# Patient Record
Sex: MALE | Race: WHITE | NOT HISPANIC OR LATINO | Employment: FULL TIME | URBAN - METROPOLITAN AREA
[De-identification: names, ages, dates, MRNs, and addresses within clinical notes are randomized per-mention and may not be internally consistent; named-entity substitution may affect disease eponyms.]

---

## 2018-01-11 NOTE — RESULT NOTES
Verified Results  (LC) TSH Rfx on Abnormal to Free T4 [id number] 07:47AM [facility]     Test Name Result Flag Reference   TSH 10 850 uIU/mL H 0 450-4 500   T4,Free (Direct) 1 21 ng/dL  0 82-1 77

## 2018-01-13 NOTE — RESULT NOTES
Verified Results  (LC) TSH reflex to T4F [id number] 12:00AM [facility]     Test Name Result Flag Reference   TSH 7 810 uIU/mL H 0 450-4 500   T4,Free (Direct) 1 11 ng/dL  0 82-1 77       Plan  TSH elevation    · (1) TSH WITH FT4 REFLEX; Status:Active;  Requested [id number];

## 2018-01-18 NOTE — PSYCH
Behavioral Health Outpatient Intake    Referred By: DR Adele Lara  Intake Questions: there are no developmental disabilities  the patient does not have a hearing impairment  the patient does not have an ICM or CTT  patient is not taking injectable psychiatric medications  Employment: The patient is employed at [employer]  Emergency Contact Information:   Emergency Contact: Liz Ledezma   Relationship to Patient: WIFE   Phone Number: [phone]   Previous Psychiatric Treatment: He has not been previously seen by a psychiatrist     He has previously been seen by a therapist  Turner Floyd   History: no  service  He has not had combat service  He 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 as a member of the [employer] or [employer]  Insurance Subscriber: [employer]   Primary Insurance: [employer]   Phone number: [phone]   ID number: [id number]   Group number: [id number]     Presenting Problem (in patient's words): ANXIETY AND PANIC ATTACKS, HAVING TROUBLE GETTING TO WORK  Substance Abuse: NONE  Previous Treatment: The patient has not been seen here in the past      Accepted as Patient   GUILHERME PRATT 6/2/16 @ 1:15     Primary Care Physician: DR [facility]   Electronically signed by : Elena Misrty, ; Jun 2 2016 11:50AM EST                       (Author)    Electronically signed by : Elena Mistry, ; Jun 2 2016 11:51AM EST                       (Author)

## 2018-01-18 NOTE — PROGRESS NOTES
Assessment    1  Panic disorder (300 01) (F41 0)   2  Generalized anxiety disorder (300 02) (F41 1)   3  Subclinical hypothyroidism (244 8) (E03 9)    Plan  Generalized anxiety disorder, Panic disorder    · Escitalopram Oxalate 10 MG Oral Tablet; TAKE 1 TABLET BY MOUTH EVERY DAY   · Follow-up visit in 3 weeks Evaluation and Treatment  Follow-up  Status: Complete  Done:  22Jan2016    Chief Complaint  Review blood work      History of Present Illness  Patient is finding that he is having greater freq of panic attacks  He would like to restart a SSRI    BW was reviewed, WNL except high TSH but normal T4  No specific sx of hypothyroidism      Active Problems    1  Epididymitis (604 90) (N45 1)   2  Generalized anxiety disorder (300 02) (F41 1)   3  Panic disorder (300 01) (F41 0)   4  Subclinical hypothyroidism (244 8) (E03 9)    Past Medical History    1  History of Denial Of Any Significant Medical History   2  History of allergic rhinitis (V12 69) (Z87 09)   3  History of chest pain (V13 89) (Z87 898)   4  History of Other acute sinusitis (461 8) (J01 80)    Surgical History    1  History of Cholecystectomy   2  History of Ear Surgery   3  History of Gastric Surgery    Family History    1  Family history of No Significant Family History    Social History    · Denied: History of Alcohol Use (History)   · Caffeine Use   · Unknown If Ever Smoked    Current Meds   1  No Reported Medications Recorded    Allergies    1  Sulfa Drugs    Vitals  Vital Signs [Data Includes: Current Encounter]    Recorded: [id number] 03:08PM   Heart Rate 76   Respiration 20   Systolic 550   Diastolic 78   Height 5 ft 7 in   Weight 201 lb 2 08 oz   BMI Calculated 31 5   BSA Calculated 2 03     Physical Exam    Constitutional   General appearance: No acute distress, well appearing and well nourished      Ears, Nose, Mouth, and Throat   External inspection of ears and nose: Normal     Otoscopic examination: Tympanic membrance translucent with normal light reflex  Canals patent without erythema  Nasal mucosa, septum, and turbinates: Normal without edema or erythema  Pulmonary   Auscultation of lungs: Clear to auscultation, equal breath sounds bilaterally, no wheezes, no rales, no rhonci  Future Appointments    Date/Time Provider Specialty Site   02/17/2016 03:15 PM DAVID Ngo   [address] [facility]     Signatures   Electronically signed by : DAVID Murray ; Jan 23 2016 11:57AM EST                       (Author)

## 2018-03-13 ENCOUNTER — TELEPHONE (OUTPATIENT)
Dept: FAMILY MEDICINE CLINIC | Facility: MEDICAL CENTER | Age: 48
End: 2018-03-13

## 2018-03-13 NOTE — TELEPHONE ENCOUNTER
Pt  Is on sertraline 25 mg and wants to know if he can take melatonin at night to help him sleep and if so what dosage

## 2018-03-14 NOTE — TELEPHONE ENCOUNTER
Yes  I would suggest 6 mg  Do not exceed 10mg   If sleep is a big issue we could also try increasing the sertraline

## 2018-07-04 DIAGNOSIS — F41.1 GAD (GENERALIZED ANXIETY DISORDER): Primary | ICD-10-CM

## 2018-07-05 RX ORDER — SERTRALINE HYDROCHLORIDE 25 MG/1
TABLET, FILM COATED ORAL
Qty: 30 TABLET | Refills: 5 | Status: SHIPPED | OUTPATIENT
Start: 2018-07-05 | End: 2018-12-29 | Stop reason: SDUPTHER

## 2018-12-29 DIAGNOSIS — F41.1 GAD (GENERALIZED ANXIETY DISORDER): ICD-10-CM

## 2018-12-31 RX ORDER — SERTRALINE HYDROCHLORIDE 25 MG/1
TABLET, FILM COATED ORAL
Qty: 30 TABLET | Refills: 5 | Status: SHIPPED | OUTPATIENT
Start: 2018-12-31 | End: 2019-02-26

## 2019-02-26 ENCOUNTER — OFFICE VISIT (OUTPATIENT)
Dept: FAMILY MEDICINE CLINIC | Facility: MEDICAL CENTER | Age: 49
End: 2019-02-26
Payer: COMMERCIAL

## 2019-02-26 VITALS
TEMPERATURE: 98.7 F | WEIGHT: 210.5 LBS | HEART RATE: 100 BPM | DIASTOLIC BLOOD PRESSURE: 70 MMHG | RESPIRATION RATE: 16 BRPM | BODY MASS INDEX: 31.9 KG/M2 | SYSTOLIC BLOOD PRESSURE: 100 MMHG | HEIGHT: 68 IN

## 2019-02-26 DIAGNOSIS — Z13.1 SCREENING FOR DIABETES MELLITUS: ICD-10-CM

## 2019-02-26 DIAGNOSIS — R79.89 TSH ELEVATION: ICD-10-CM

## 2019-02-26 DIAGNOSIS — Z13.220 SCREENING FOR LIPID DISORDERS: ICD-10-CM

## 2019-02-26 DIAGNOSIS — Z13.29 SCREENING FOR THYROID DISORDER: ICD-10-CM

## 2019-02-26 DIAGNOSIS — F41.0 PANIC DISORDER: Primary | ICD-10-CM

## 2019-02-26 DIAGNOSIS — Z00.00 PREVENTATIVE HEALTH CARE: ICD-10-CM

## 2019-02-26 PROCEDURE — 99396 PREV VISIT EST AGE 40-64: CPT | Performed by: FAMILY MEDICINE

## 2019-02-26 RX ORDER — ESCITALOPRAM OXALATE 10 MG/1
10 TABLET ORAL DAILY
Qty: 30 TABLET | Refills: 3 | Status: SHIPPED | OUTPATIENT
Start: 2019-02-26 | End: 2019-06-30 | Stop reason: SDUPTHER

## 2019-02-27 NOTE — PROGRESS NOTES
Patient here for preventative maintenance visit  He is , does have a current girlfriend  He works full-time  He does not smoke or abuse alcohol  Past medical history, past surgical history, family medical history, social history, and medication list were all reviewed  Review of systems for GI  cardiac pulmonary and neurologic systems are all negative  ENT review of systems is also negative  He does however have significant long-term history of panic disorder and anxiety  He has currently been taking sertraline 25 mg  He finds that that is not helping enough  He is not suicidal   He is fully functional at work and at home  He does has periods of time where he gets very panicked and anxious  /70   Pulse 100   Temp 98 7 °F (37 1 °C) (Oral)   Resp 16   Ht 5' 7 5" (1 715 m)   Wt 95 5 kg (210 lb 8 oz)   BMI 32 48 kg/m²     HEENT examination is within normal limits no acute findings  Neck was supple  Chest clear  Cardiac exam revealed a regular rate and rhythm without murmur rub or gallop  Abdomen is soft and nontender  Will change his sertraline 25 mg to Lexapro 10 mg  Recheck in 3-4 weeks  Call if any problems with the medication  Will also get screening blood work  His family history is such that cancer screening does not need to start at this time

## 2019-03-04 ENCOUNTER — TELEPHONE (OUTPATIENT)
Dept: FAMILY MEDICINE CLINIC | Facility: MEDICAL CENTER | Age: 49
End: 2019-03-04

## 2019-03-04 NOTE — TELEPHONE ENCOUNTER
Switching meds from sertraline to Lexapro  Wants to start tonight  Thinks Dr Daisy Trevino told him just to stop the one and start the other (he's already on the lowest dose)  Could that be right?

## 2019-06-30 DIAGNOSIS — F41.0 PANIC DISORDER: ICD-10-CM

## 2019-07-01 RX ORDER — ESCITALOPRAM OXALATE 10 MG/1
TABLET ORAL
Qty: 30 TABLET | Refills: 3 | Status: SHIPPED | OUTPATIENT
Start: 2019-07-01 | End: 2019-10-28 | Stop reason: SDUPTHER

## 2019-07-03 ENCOUNTER — APPOINTMENT (OUTPATIENT)
Dept: LAB | Facility: MEDICAL CENTER | Age: 49
End: 2019-07-03
Payer: COMMERCIAL

## 2019-07-03 DIAGNOSIS — F41.0 PANIC DISORDER: ICD-10-CM

## 2019-07-03 DIAGNOSIS — Z00.00 PREVENTATIVE HEALTH CARE: ICD-10-CM

## 2019-07-03 DIAGNOSIS — R79.89 TSH ELEVATION: ICD-10-CM

## 2019-07-03 DIAGNOSIS — Z13.1 SCREENING FOR DIABETES MELLITUS: ICD-10-CM

## 2019-07-03 DIAGNOSIS — Z13.220 SCREENING FOR LIPID DISORDERS: ICD-10-CM

## 2019-07-03 LAB
ALBUMIN SERPL BCP-MCNC: 3.6 G/DL (ref 3.5–5)
ALP SERPL-CCNC: 56 U/L (ref 46–116)
ALT SERPL W P-5'-P-CCNC: 57 U/L (ref 12–78)
ANION GAP SERPL CALCULATED.3IONS-SCNC: 6 MMOL/L (ref 4–13)
AST SERPL W P-5'-P-CCNC: 29 U/L (ref 5–45)
BASOPHILS # BLD AUTO: 0.04 THOUSANDS/ΜL (ref 0–0.1)
BASOPHILS NFR BLD AUTO: 1 % (ref 0–1)
BILIRUB SERPL-MCNC: 1.29 MG/DL (ref 0.2–1)
BUN SERPL-MCNC: 13 MG/DL (ref 5–25)
CALCIUM SERPL-MCNC: 8.5 MG/DL (ref 8.3–10.1)
CHLORIDE SERPL-SCNC: 112 MMOL/L (ref 100–108)
CHOLEST SERPL-MCNC: 108 MG/DL (ref 50–200)
CO2 SERPL-SCNC: 26 MMOL/L (ref 21–32)
CREAT SERPL-MCNC: 0.88 MG/DL (ref 0.6–1.3)
EOSINOPHIL # BLD AUTO: 0.11 THOUSAND/ΜL (ref 0–0.61)
EOSINOPHIL NFR BLD AUTO: 2 % (ref 0–6)
ERYTHROCYTE [DISTWIDTH] IN BLOOD BY AUTOMATED COUNT: 14 % (ref 11.6–15.1)
GFR SERPL CREATININE-BSD FRML MDRD: 102 ML/MIN/1.73SQ M
GLUCOSE P FAST SERPL-MCNC: 83 MG/DL (ref 65–99)
HCT VFR BLD AUTO: 45.1 % (ref 36.5–49.3)
HDLC SERPL-MCNC: 42 MG/DL (ref 40–60)
HGB BLD-MCNC: 14.6 G/DL (ref 12–17)
IMM GRANULOCYTES # BLD AUTO: 0.01 THOUSAND/UL (ref 0–0.2)
IMM GRANULOCYTES NFR BLD AUTO: 0 % (ref 0–2)
LDLC SERPL CALC-MCNC: 54 MG/DL (ref 0–100)
LYMPHOCYTES # BLD AUTO: 1.68 THOUSANDS/ΜL (ref 0.6–4.47)
LYMPHOCYTES NFR BLD AUTO: 37 % (ref 14–44)
MCH RBC QN AUTO: 27.8 PG (ref 26.8–34.3)
MCHC RBC AUTO-ENTMCNC: 32.4 G/DL (ref 31.4–37.4)
MCV RBC AUTO: 86 FL (ref 82–98)
MONOCYTES # BLD AUTO: 0.36 THOUSAND/ΜL (ref 0.17–1.22)
MONOCYTES NFR BLD AUTO: 8 % (ref 4–12)
NEUTROPHILS # BLD AUTO: 2.35 THOUSANDS/ΜL (ref 1.85–7.62)
NEUTS SEG NFR BLD AUTO: 52 % (ref 43–75)
NRBC BLD AUTO-RTO: 0 /100 WBCS
PLATELET # BLD AUTO: 226 THOUSANDS/UL (ref 149–390)
PMV BLD AUTO: 11.5 FL (ref 8.9–12.7)
POTASSIUM SERPL-SCNC: 3.8 MMOL/L (ref 3.5–5.3)
PROT SERPL-MCNC: 7.1 G/DL (ref 6.4–8.2)
RBC # BLD AUTO: 5.25 MILLION/UL (ref 3.88–5.62)
SODIUM SERPL-SCNC: 144 MMOL/L (ref 136–145)
T4 FREE SERPL-MCNC: 0.86 NG/DL (ref 0.76–1.46)
TRIGL SERPL-MCNC: 60 MG/DL
TSH SERPL DL<=0.05 MIU/L-ACNC: 5.53 UIU/ML (ref 0.36–3.74)
WBC # BLD AUTO: 4.55 THOUSAND/UL (ref 4.31–10.16)

## 2019-07-03 PROCEDURE — 84439 ASSAY OF FREE THYROXINE: CPT

## 2019-07-03 PROCEDURE — 80053 COMPREHEN METABOLIC PANEL: CPT

## 2019-07-03 PROCEDURE — 36415 COLL VENOUS BLD VENIPUNCTURE: CPT

## 2019-07-03 PROCEDURE — 80061 LIPID PANEL: CPT

## 2019-07-03 PROCEDURE — 85025 COMPLETE CBC W/AUTO DIFF WBC: CPT

## 2019-07-03 PROCEDURE — 84443 ASSAY THYROID STIM HORMONE: CPT

## 2019-10-28 DIAGNOSIS — F41.0 PANIC DISORDER: ICD-10-CM

## 2019-10-28 RX ORDER — ESCITALOPRAM OXALATE 10 MG/1
TABLET ORAL
Qty: 30 TABLET | Refills: 3 | Status: SHIPPED | OUTPATIENT
Start: 2019-10-28 | End: 2020-03-06

## 2019-12-11 ENCOUNTER — OFFICE VISIT (OUTPATIENT)
Dept: FAMILY MEDICINE CLINIC | Facility: MEDICAL CENTER | Age: 49
End: 2019-12-11
Payer: COMMERCIAL

## 2019-12-11 VITALS
SYSTOLIC BLOOD PRESSURE: 120 MMHG | BODY MASS INDEX: 33.49 KG/M2 | WEIGHT: 217 LBS | HEART RATE: 87 BPM | OXYGEN SATURATION: 98 % | DIASTOLIC BLOOD PRESSURE: 82 MMHG

## 2019-12-11 DIAGNOSIS — N62 GYNECOMASTIA, MALE: Primary | ICD-10-CM

## 2019-12-11 DIAGNOSIS — F41.0 PANIC DISORDER: ICD-10-CM

## 2019-12-11 PROCEDURE — 1036F TOBACCO NON-USER: CPT | Performed by: FAMILY MEDICINE

## 2019-12-11 PROCEDURE — 99214 OFFICE O/P EST MOD 30 MIN: CPT | Performed by: FAMILY MEDICINE

## 2019-12-12 NOTE — PROGRESS NOTES
Patient is here for follow-up  He is being prescribed Lexapro for anxiety and depression  It has been very effective in he is happy with his results  He also complains of gynecomastia, the left being greater than the right  Past medical history, past surgical history, family medical history, social history, and medication list were all reviewed  Review of systems for GI  cardiac pulmonary and neurologic systems are all negative  ENT review of systems is also negative  /82 (BP Location: Left arm, Patient Position: Sitting, Cuff Size: Large)   Pulse 87   Wt 98 4 kg (217 lb)   SpO2 98%   BMI 33 49 kg/m²     HEENT examination is within normal limits no acute findings  Neck was supple  Chest clear  Cardiac exam revealed a regular rate and rhythm without murmur rub or gallop  Abdomen is soft and nontender  He does have gynecomastia on the left, it feels more glandular than adipose  It is important to note that he has had elevated TSH  Normal T4 is  Will check hormones and pituitary hormones  Continue Lexapro

## 2020-02-03 ENCOUNTER — TELEPHONE (OUTPATIENT)
Dept: FAMILY MEDICINE CLINIC | Facility: MEDICAL CENTER | Age: 50
End: 2020-02-03

## 2020-02-03 NOTE — TELEPHONE ENCOUNTER
Patient left message, he has had right sided pains for one week, asking for appointment with Dr Leach Persons    Called patient back to triage with nurse and left message

## 2020-02-03 NOTE — TELEPHONE ENCOUNTER
C/o intermittent right sided abdominal pain x one week, now it is constant   Feels fine otherwise  No NVD, No fever, no urinary symptoms  States he does not have appendix or gallbladder    appt tomorrow

## 2020-02-04 ENCOUNTER — OFFICE VISIT (OUTPATIENT)
Dept: FAMILY MEDICINE CLINIC | Facility: MEDICAL CENTER | Age: 50
End: 2020-02-04
Payer: COMMERCIAL

## 2020-02-04 VITALS
BODY MASS INDEX: 32.96 KG/M2 | TEMPERATURE: 98.4 F | WEIGHT: 217.5 LBS | DIASTOLIC BLOOD PRESSURE: 70 MMHG | HEIGHT: 68 IN | RESPIRATION RATE: 14 BRPM | HEART RATE: 70 BPM | SYSTOLIC BLOOD PRESSURE: 110 MMHG

## 2020-02-04 DIAGNOSIS — R10.30 LOWER ABDOMINAL PAIN: Primary | ICD-10-CM

## 2020-02-04 PROCEDURE — 3008F BODY MASS INDEX DOCD: CPT | Performed by: FAMILY MEDICINE

## 2020-02-04 PROCEDURE — 99213 OFFICE O/P EST LOW 20 MIN: CPT | Performed by: FAMILY MEDICINE

## 2020-02-06 ENCOUNTER — APPOINTMENT (OUTPATIENT)
Dept: LAB | Facility: MEDICAL CENTER | Age: 50
End: 2020-02-06
Payer: COMMERCIAL

## 2020-02-06 ENCOUNTER — TRANSCRIBE ORDERS (OUTPATIENT)
Dept: ADMINISTRATIVE | Facility: HOSPITAL | Age: 50
End: 2020-02-06

## 2020-02-06 DIAGNOSIS — N62 GYNECOMASTIA, MALE: ICD-10-CM

## 2020-02-06 DIAGNOSIS — N62 GYNECOMASTIA, MALE: Primary | ICD-10-CM

## 2020-02-10 NOTE — PROGRESS NOTES
Patient is having some mild abdominal pain it comes and goes  He has had no vomiting, no diarrhea, no constipation, no fever, no urinary symptoms  Review of systems is also otherwise negative  /70   Pulse 70   Temp 98 4 °F (36 9 °C)   Resp 14   Ht 5' 7 5" (1 715 m)   Wt 98 7 kg (217 lb 8 oz)   BMI 33 56 kg/m²     HEENT examination is within normal limits no acute findings  Neck was supple  Chest clear  Cardiac exam revealed a regular rate and rhythm without murmur rub or gallop  Abdomen is soft and nontender  No findings on abdominal exam    Will observe abdominal pain, if it worsens or if additional symptoms begin, will begin workup

## 2020-03-06 DIAGNOSIS — F41.0 PANIC DISORDER: ICD-10-CM

## 2020-03-06 RX ORDER — ESCITALOPRAM OXALATE 10 MG/1
TABLET ORAL
Qty: 30 TABLET | Refills: 3 | Status: SHIPPED | OUTPATIENT
Start: 2020-03-06 | End: 2020-07-13

## 2020-07-12 DIAGNOSIS — F41.0 PANIC DISORDER: ICD-10-CM

## 2020-07-13 RX ORDER — ESCITALOPRAM OXALATE 10 MG/1
TABLET ORAL
Qty: 30 TABLET | Refills: 3 | Status: SHIPPED | OUTPATIENT
Start: 2020-07-13 | End: 2021-01-11

## 2020-11-05 ENCOUNTER — HOSPITAL ENCOUNTER (OUTPATIENT)
Dept: RADIOLOGY | Facility: HOSPITAL | Age: 50
Discharge: HOME | End: 2020-11-05
Payer: COMMERCIAL

## 2020-11-05 ENCOUNTER — TRANSCRIBE ORDERS (OUTPATIENT)
Dept: ADMINISTRATIVE | Facility: HOSPITAL | Age: 50
End: 2020-11-05

## 2020-11-05 DIAGNOSIS — M54.50 LOW BACK PAIN, UNSPECIFIED BACK PAIN LATERALITY, UNSPECIFIED CHRONICITY, UNSPECIFIED WHETHER SCIATICA PRESENT: Primary | ICD-10-CM

## 2020-11-05 DIAGNOSIS — M54.50 LOW BACK PAIN, UNSPECIFIED BACK PAIN LATERALITY, UNSPECIFIED CHRONICITY, UNSPECIFIED WHETHER SCIATICA PRESENT: ICD-10-CM

## 2020-11-05 PROCEDURE — 72100 X-RAY EXAM L-S SPINE 2/3 VWS: CPT

## 2020-11-05 PROCEDURE — 72072 X-RAY EXAM THORAC SPINE 3VWS: CPT

## 2021-01-11 DIAGNOSIS — F41.0 PANIC DISORDER: ICD-10-CM

## 2021-01-12 RX ORDER — ESCITALOPRAM OXALATE 10 MG/1
TABLET ORAL
Qty: 30 TABLET | Refills: 0 | Status: SHIPPED | OUTPATIENT
Start: 2021-01-12 | End: 2021-02-12

## 2021-02-12 ENCOUNTER — TELEMEDICINE (OUTPATIENT)
Dept: FAMILY MEDICINE CLINIC | Facility: MEDICAL CENTER | Age: 51
End: 2021-02-12
Payer: COMMERCIAL

## 2021-02-12 VITALS — BODY MASS INDEX: 32.96 KG/M2 | WEIGHT: 210 LBS | HEIGHT: 67 IN

## 2021-02-12 DIAGNOSIS — F41.9 ANXIETY: Primary | ICD-10-CM

## 2021-02-12 DIAGNOSIS — F41.0 PANIC DISORDER: ICD-10-CM

## 2021-02-12 PROCEDURE — 99442 PR PHYS/QHP TELEPHONE EVALUATION 11-20 MIN: CPT | Performed by: NURSE PRACTITIONER

## 2021-02-12 RX ORDER — ESCITALOPRAM OXALATE 10 MG/1
10 TABLET ORAL DAILY
Qty: 90 TABLET | Refills: 3 | Status: SHIPPED | OUTPATIENT
Start: 2021-02-12 | End: 2022-02-23

## 2021-02-12 NOTE — PATIENT INSTRUCTIONS
Anxiety   AMBULATORY CARE:   Anxiety  is a condition that causes you to feel extremely worried or nervous  The feelings are so strong that they can cause problems with your daily activities or sleep  Anxiety may be triggered by something you fear, or it may happen without a cause  Family or work stress, smoking, caffeine, and alcohol can increase your risk for anxiety  Certain medicines or health conditions can also increase your risk  Anxiety can become a long-term condition if it is not managed or treated  Common signs and symptoms that may occur with anxiety:   · Fatigue or muscle tightness    · Shaking, restlessness, or irritability    · Problems focusing    · Trouble sleeping    · Feeling jumpy, easily startled, or dizzy    · Rapid heartbeat or shortness of breath    Call your local emergency number (911 in the [address]) if:   · You have chest pain, tightness, or heaviness that may spread to your shoulders, arms, jaw, neck, or back  · You feel like hurting yourself or someone else  Call your doctor if:   · Your symptoms get worse or do not get better with treatment  · Your anxiety keeps you from doing your regular daily activities  · You have new symptoms since your last visit  · You have questions or concerns about your condition or care  Treatment for anxiety  may include medicines to help you feel calm and relaxed, and decrease your symptoms  Medicines are usually given together with therapy or other treatments  Manage anxiety:   · Talk to someone about your anxiety  Your healthcare provider may suggest counseling  Cognitive behavioral therapy can help you understand and change how you react to events that trigger your symptoms  You might feel more comfortable talking with a friend or family member about your anxiety  Choose someone you know will be supportive and encouraging  · Find ways to relax  Activities such as exercise, meditation, or listening to music can help you relax   Spend time with friends, or do things you enjoy  · Practice deep breathing  Deep breathing can help you relax when you feel anxious  Focus on taking slow, deep breaths several times a day, or during an anxiety attack  Breathe in through your nose and out through your mouth  · Create a regular sleep routine  Regular sleep can help you feel calmer during the day  Go to sleep and wake up at the same times every day  Do not watch television or use the computer right before bed  Your room should be comfortable, dark, and quiet  · Eat a variety of healthy foods  Healthy foods include fruits, vegetables, low-fat dairy products, lean meats, fish, whole-grain breads, and cooked beans  Healthy foods can help you feel less anxious and have more energy  · Exercise regularly  Exercise can increase your energy level  Exercise may also lift your mood and help you sleep better  Your healthcare provider can help you create an exercise plan  · Do not smoke  Nicotine and other chemicals in cigarettes and cigars can increase anxiety  Ask your healthcare provider for information if you currently smoke and need help to quit  E-cigarettes or smokeless tobacco still contain nicotine  Talk to your healthcare provider before you use these products  · Do not have caffeine  Caffeine can make your symptoms worse  Do not have foods or drinks that are meant to increase your energy level  · Limit or do not drink alcohol  Ask your healthcare provider if alcohol is safe for you  You may not be able to drink alcohol if you take certain anxiety or depression medicines  Limit alcohol to 1 drink per day if you are a woman  Limit alcohol to 2 drinks per day if you are a man  A drink of alcohol is 12 ounces of beer, 5 ounces of wine, or 1½ ounces of liquor  · Do not use drugs  Drugs can make your anxiety worse  It can also make anxiety hard to manage   Talk to your healthcare provider if you use drugs and want help to quit     Follow up with your doctor within 2 weeks or as directed:  Write down your questions so you remember to ask them during your visits  © Copyright [address] Information is for End User's use only and may not be sold, redistributed or otherwise used for commercial purposes  All illustrations and images included in CareNotes® are the copyrighted property of A SARIKA PEDRO TOOVIA Meri  or [facility] Jin Epstein   The above information is an  only  It is not intended as medical advice for individual conditions or treatments  Talk to your doctor, nurse or pharmacist before following any medical regimen to see if it is safe and effective for you

## 2021-02-12 NOTE — PROGRESS NOTES
Virtual Brief Visit    Assessment/Plan: please take medications as prescribed  Continue to see behavioral health therapist for phobia management  Follow-up in 6 months or sooner if not feeling well  ER for SI/HI with intent or plan  Problem List Items Addressed This Visit        Other    Anxiety - Primary                Reason for visit is   Chief Complaint   Patient presents with    Medication Refill     Lexapro    Virtual Brief Visit        Encounter provider FOSTER Craig    Provider located at [address] [place] [id number]    Recent Visits  No visits were found meeting these conditions  Showing recent visits within past 7 days and meeting all other requirements     Today's Visits  Date Type Provider Dept   02/12/21 Telemedicine FOSTER [facility]   Showing today's visits and meeting all other requirements     Future Appointments  No visits were found meeting these conditions  Showing future appointments within next 150 days and meeting all other requirements        After connecting through telephone, the patient was identified by name and date of birth  Gabe Hanson was informed that this is a telemedicine visit and that the visit is being conducted through telephone  My office door was closed  No one else was in the room  He acknowledged consent and understanding of privacy and security of the platform  The patient has agreed to participate and understands he can discontinue the visit at any time  Patient is aware this is a billable service  Subjective    Gabe Hanson is a 53 y o  male anxiety  This 71-year-old male presents today for follow-up on anxiety  MANUEL-7 and PHQ-9 done indicating minimal anxiety and depression  He is compliant with current medication regimen and denies any side effects  He needs medication refill today    He denies SI/HI with intent or plan and does live in a safe environment  He offers no complaints  He is followed by behavioral health therapist for his phobias which are well controlled  Past Medical History:   Diagnosis Date    Anxiety     Disease of thyroid gland        Past Surgical History:   Procedure Laterality Date    APPENDECTOMY  2015    CHOLECYSTECTOMY      EAR SURGERY      STOMACH SURGERY         Current Outpatient Medications   Medication Sig Dispense Refill    escitalopram (LEXAPRO) 10 mg tablet Take 1 tablet (10 mg total) by mouth daily 90 tablet 3     No current facility-administered medications for this visit  Allergies   Allergen Reactions    Sulfa Antibiotics Rash       Review of Systems   Constitutional: Negative  HENT: Negative  Eyes: Negative  Respiratory: Negative  Cardiovascular: Negative  Gastrointestinal: Negative  Endocrine: Negative  Genitourinary: Negative  Musculoskeletal: Negative  Skin: Negative  Allergic/Immunologic: Negative  Neurological: Negative  Hematological: Negative  Psychiatric/Behavioral: Negative  All other systems reviewed and are negative  Vitals:    02/12/21 1241   Weight: 95 3 kg (210 lb)   Height: 5' 7" (1 702 m)         I spent 15 minutes directly with the patient during this visit    VIRTUAL VISIT DISCLAIMER    Betzy Martinez acknowledges that he has consented to an online visit or consultation  He understands that the online visit is based solely on information provided by him, and that, in the absence of a face-to-face physical evaluation by the physician, the diagnosis he receives is both limited and provisional in terms of accuracy and completeness  This is not intended to replace a full medical face-to-face evaluation by the physician  Betzy Martinez understands and accepts these terms

## 2021-08-16 ENCOUNTER — HOSPITAL ENCOUNTER (OUTPATIENT)
Dept: RADIOLOGY | Facility: HOSPITAL | Age: 51
Discharge: HOME | End: 2021-08-16
Payer: COMMERCIAL

## 2021-08-16 DIAGNOSIS — R07.81 RIB PAIN ON RIGHT SIDE: ICD-10-CM

## 2021-08-16 PROCEDURE — 71101 X-RAY EXAM UNILAT RIBS/CHEST: CPT

## 2022-01-24 ENCOUNTER — TELEPHONE (OUTPATIENT)
Dept: FAMILY MEDICINE CLINIC | Facility: MEDICAL CENTER | Age: 52
End: 2022-01-24

## 2022-01-24 NOTE — TELEPHONE ENCOUNTER
Pt called to ask if we could write a letter for him stating that you are treating him for anxiety  He is trying to get Medicaid and he needs this info for that  If so, please put in patient My Chart when done   Thank you;

## 2022-01-26 NOTE — TELEPHONE ENCOUNTER
Pt called to inquire about letter  He asked to have it emailed to him at [email or web address] because he is having difficulty with his MyChart      Routed to Dr Ronald Parker

## 2022-02-22 DIAGNOSIS — F41.0 PANIC DISORDER: ICD-10-CM

## 2022-02-23 RX ORDER — ESCITALOPRAM OXALATE 10 MG/1
TABLET ORAL
Qty: 30 TABLET | Refills: 3 | Status: SHIPPED | OUTPATIENT
Start: 2022-02-23 | End: 2022-07-01

## 2022-04-13 ENCOUNTER — OFFICE VISIT (OUTPATIENT)
Dept: FAMILY MEDICINE CLINIC | Facility: MEDICAL CENTER | Age: 52
End: 2022-04-13

## 2022-04-13 VITALS
DIASTOLIC BLOOD PRESSURE: 80 MMHG | HEART RATE: 80 BPM | BODY MASS INDEX: 33.43 KG/M2 | SYSTOLIC BLOOD PRESSURE: 110 MMHG | TEMPERATURE: 98.1 F | WEIGHT: 213 LBS | HEIGHT: 67 IN | OXYGEN SATURATION: 98 %

## 2022-04-13 DIAGNOSIS — E66.9 OBESITY (BMI 30.0-34.9): ICD-10-CM

## 2022-04-13 DIAGNOSIS — Z13.220 SCREENING FOR LIPID DISORDERS: ICD-10-CM

## 2022-04-13 DIAGNOSIS — Z12.5 SCREENING FOR PROSTATE CANCER: ICD-10-CM

## 2022-04-13 DIAGNOSIS — F41.9 ANXIETY: ICD-10-CM

## 2022-04-13 DIAGNOSIS — Z13.1 SCREENING FOR DIABETES MELLITUS: ICD-10-CM

## 2022-04-13 DIAGNOSIS — M53.3 SACROILIAC PAIN: ICD-10-CM

## 2022-04-13 DIAGNOSIS — Z00.00 PREVENTATIVE HEALTH CARE: Primary | ICD-10-CM

## 2022-04-13 PROBLEM — E66.811 OBESITY (BMI 30.0-34.9): Status: ACTIVE | Noted: 2022-04-13

## 2022-04-13 PROCEDURE — 99396 PREV VISIT EST AGE 40-64: CPT | Performed by: FAMILY MEDICINE

## 2022-04-13 RX ORDER — MELOXICAM 15 MG/1
15 TABLET ORAL DAILY
Qty: 30 TABLET | Refills: 1 | Status: SHIPPED | OUTPATIENT
Start: 2022-04-13

## 2022-04-20 NOTE — ASSESSMENT & PLAN NOTE
BMI Counseling: Body mass index is 33 36 kg/m²  The BMI is above normal  Nutrition recommendations include reducing portion sizes and 3-5 servings of fruits/vegetables daily  Exercise recommendations include moderate aerobic physical activity for 150 minutes/week

## 2022-04-20 NOTE — ASSESSMENT & PLAN NOTE
He has chronic back pain  It is over the sacroiliac joint  Discussed core strengthening, use meloxicam as needed  Safe lifting  We can refer to physical therapy if it is not responding

## 2022-04-20 NOTE — PROGRESS NOTES
Assessment/Plan:    Sacroiliac pain  He has chronic back pain  It is over the sacroiliac joint  Discussed core strengthening, use meloxicam as needed  Safe lifting  We can refer to physical therapy if it is not responding  Obesity (BMI 30 0-34  9)  BMI Counseling: Body mass index is 33 36 kg/m²  The BMI is above normal  Nutrition recommendations include reducing portion sizes and 3-5 servings of fruits/vegetables daily  Exercise recommendations include moderate aerobic physical activity for 150 minutes/week  Anxiety  He has generalized anxiety and panic disorder  He is taking Lexapro 10 mg  It is working well for him  Has no problems with the medication  Continue Lexapro mg q day  Problem List Items Addressed This Visit        Other    Anxiety     He has generalized anxiety and panic disorder  He is taking Lexapro 10 mg  It is working well for him  Has no problems with the medication  Continue Lexapro mg q day  Sacroiliac pain     He has chronic back pain  It is over the sacroiliac joint  Discussed core strengthening, use meloxicam as needed  Safe lifting  We can refer to physical therapy if it is not responding  Relevant Medications    meloxicam (Mobic) 15 mg tablet    Obesity (BMI 30 0-34  9)     BMI Counseling: Body mass index is 33 36 kg/m²  The BMI is above normal  Nutrition recommendations include reducing portion sizes and 3-5 servings of fruits/vegetables daily  Exercise recommendations include moderate aerobic physical activity for 150 minutes/week  Other Visit Diagnoses     Preventative health care    -  Primary    Screening for prostate cancer        Relevant Orders    PSA, Total Screen    Screening for diabetes mellitus        Relevant Orders    Basic metabolic panel    Screening for lipid disorders        Relevant Orders    Lipid Panel with Direct LDL reflex            Subjective:      Patient ID: Yusef Gary is a 49 y o  male     Patient is here for complete physical   He is  and lives with his wife  He has three adult children  He is not up-to-date with colorectal screening  He currently does not have insurance  Will refer him to GI or colorectal for colonoscopy when he has health insurance at that time  The following portions of the patient's history were reviewed and updated as appropriate: allergies, current medications, past family history, past medical history, past social history, past surgical history and problem list     Review of Systems   Constitutional: Negative for activity change, fatigue and fever  HENT: Negative for congestion, ear discharge, ear pain, postnasal drip, rhinorrhea, sinus pain, sneezing and sore throat  Eyes: Negative for photophobia, pain, discharge and redness  Respiratory: Negative for apnea, cough, shortness of breath and wheezing  Cardiovascular: Negative for chest pain and palpitations  Gastrointestinal: Negative for abdominal pain, blood in stool, constipation, diarrhea, nausea and vomiting  Endocrine: Negative for polydipsia, polyphagia and polyuria  Genitourinary: Negative for decreased urine volume, difficulty urinating, dysuria, frequency, penile discharge, penile pain and urgency  Musculoskeletal: Negative for arthralgias, gait problem, joint swelling and neck pain  Skin: Negative for color change and rash  Neurological: Negative for dizziness, tremors, seizures, weakness and headaches  Psychiatric/Behavioral: Negative for agitation and sleep disturbance  The patient is not nervous/anxious  Objective:      /80 (BP Location: Left arm, Patient Position: Sitting, Cuff Size: Large)   Pulse 80   Temp 98 1 °F (36 7 °C)   Ht 5' 7" (1 702 m)   Wt 96 6 kg (213 lb)   SpO2 98%   BMI 33 36 kg/m²          Physical Exam  Vitals and nursing note reviewed  Constitutional:       Appearance: Normal appearance  He is well-developed     HENT: Head: Normocephalic  Right Ear: External ear normal       Left Ear: External ear normal       Nose: Nose normal    Eyes:      General: Lids are normal       Conjunctiva/sclera: Conjunctivae normal       Pupils: Pupils are equal, round, and reactive to light  Neck:      Thyroid: No thyromegaly  Vascular: No carotid bruit  Cardiovascular:      Rate and Rhythm: Normal rate and regular rhythm  Pulses: Normal pulses  Heart sounds: Normal heart sounds, S1 normal and S2 normal  No murmur heard  Pulmonary:      Effort: Pulmonary effort is normal  No respiratory distress  Breath sounds: Normal breath sounds  No wheezing or rales  Abdominal:      General: Bowel sounds are normal       Palpations: Abdomen is soft  There is no mass  Tenderness: There is no abdominal tenderness  Musculoskeletal:         General: Normal range of motion  Cervical back: Normal range of motion and neck supple  Lymphadenopathy:      Cervical: No cervical adenopathy  Skin:     General: Skin is warm and dry  Coloration: Skin is not pale  Findings: No rash  Neurological:      General: No focal deficit present  Mental Status: He is alert and oriented to person, place, and time  Cranial Nerves: No cranial nerve deficit  Sensory: No sensory deficit  Deep Tendon Reflexes: Reflexes are normal and symmetric  Psychiatric:         Mood and Affect: Mood normal          Behavior: Behavior normal  Behavior is cooperative  Thought Content:  Thought content normal          Judgment: Judgment normal

## 2022-04-20 NOTE — ASSESSMENT & PLAN NOTE
He has generalized anxiety and panic disorder  He is taking Lexapro 10 mg  It is working well for him  Has no problems with the medication  Continue Lexapro mg q day

## 2022-07-01 DIAGNOSIS — F41.0 PANIC DISORDER: ICD-10-CM

## 2022-07-01 RX ORDER — ESCITALOPRAM OXALATE 10 MG/1
TABLET ORAL
Qty: 30 TABLET | Refills: 3 | Status: SHIPPED | OUTPATIENT
Start: 2022-07-01

## 2022-08-04 ENCOUNTER — HOSPITAL ENCOUNTER (EMERGENCY)
Facility: HOSPITAL | Age: 52
Discharge: HOME | End: 2022-08-05
Attending: EMERGENCY MEDICINE | Admitting: EMERGENCY MEDICINE

## 2022-08-04 ENCOUNTER — APPOINTMENT (EMERGENCY)
Dept: CT IMAGING | Facility: HOSPITAL | Age: 52
End: 2022-08-04

## 2022-08-04 VITALS
TEMPERATURE: 98.1 F | DIASTOLIC BLOOD PRESSURE: 85 MMHG | RESPIRATION RATE: 18 BRPM | HEART RATE: 70 BPM | OXYGEN SATURATION: 95 % | SYSTOLIC BLOOD PRESSURE: 154 MMHG

## 2022-08-04 DIAGNOSIS — N20.1 URETEROLITHIASIS: Primary | ICD-10-CM

## 2022-08-04 LAB
ALBUMIN SERPL BCP-MCNC: 4.3 G/DL (ref 3.5–5)
ALP SERPL-CCNC: 42 U/L (ref 34–104)
ALT SERPL W P-5'-P-CCNC: 45 U/L (ref 7–52)
ANION GAP SERPL CALCULATED.3IONS-SCNC: 6 MMOL/L (ref 4–13)
AST SERPL W P-5'-P-CCNC: 42 U/L (ref 13–39)
BACTERIA UR QL AUTO: ABNORMAL /HPF
BASOPHILS # BLD MANUAL: 0 THOUSAND/UL (ref 0–0.1)
BASOPHILS NFR MAR MANUAL: 0 % (ref 0–1)
BILIRUB SERPL-MCNC: 1.32 MG/DL (ref 0.2–1)
BILIRUB UR QL STRIP: NEGATIVE
BUN SERPL-MCNC: 15 MG/DL (ref 5–25)
CALCIUM SERPL-MCNC: 9.3 MG/DL (ref 8.4–10.2)
CHLORIDE SERPL-SCNC: 105 MMOL/L (ref 96–108)
CLARITY UR: CLEAR
CO2 SERPL-SCNC: 26 MMOL/L (ref 21–32)
COLOR UR: YELLOW
CREAT SERPL-MCNC: 1.17 MG/DL (ref 0.6–1.3)
EOSINOPHIL # BLD MANUAL: 0 THOUSAND/UL (ref 0–0.4)
EOSINOPHIL NFR BLD MANUAL: 0 % (ref 0–6)
ERYTHROCYTE [DISTWIDTH] IN BLOOD BY AUTOMATED COUNT: 13.7 % (ref 11.6–15.1)
GFR SERPL CREATININE-BSD FRML MDRD: 71 ML/MIN/1.73SQ M
GLUCOSE SERPL-MCNC: 143 MG/DL (ref 65–140)
GLUCOSE UR STRIP-MCNC: ABNORMAL MG/DL
HCT VFR BLD AUTO: 47.7 % (ref 36.5–49.3)
HGB BLD-MCNC: 16 G/DL (ref 12–17)
HGB UR QL STRIP.AUTO: ABNORMAL
KETONES UR STRIP-MCNC: ABNORMAL MG/DL
LEUKOCYTE ESTERASE UR QL STRIP: NEGATIVE
LG PLATELETS BLD QL SMEAR: PRESENT
LIPASE SERPL-CCNC: 8 U/L (ref 11–82)
LYMPHOCYTES # BLD AUTO: 0.65 THOUSAND/UL (ref 0.6–4.47)
LYMPHOCYTES # BLD AUTO: 6 % (ref 14–44)
MCH RBC QN AUTO: 28.4 PG (ref 26.8–34.3)
MCHC RBC AUTO-ENTMCNC: 33.5 G/DL (ref 31.4–37.4)
MCV RBC AUTO: 85 FL (ref 82–98)
METAMYELOCYTES NFR BLD MANUAL: 1 % (ref 0–1)
MONOCYTES # BLD AUTO: 0.11 THOUSAND/UL (ref 0–1.22)
MONOCYTES NFR BLD: 1 % (ref 4–12)
MUCOUS THREADS UR QL AUTO: ABNORMAL
NEUTROPHILS # BLD MANUAL: 10.02 THOUSAND/UL (ref 1.85–7.62)
NEUTS BAND NFR BLD MANUAL: 2 % (ref 0–8)
NEUTS SEG NFR BLD AUTO: 90 % (ref 43–75)
NITRITE UR QL STRIP: NEGATIVE
NON-SQ EPI CELLS URNS QL MICRO: ABNORMAL /HPF
PH UR STRIP.AUTO: 5.5 [PH]
PLATELET # BLD AUTO: 237 THOUSANDS/UL (ref 149–390)
PLATELET BLD QL SMEAR: ADEQUATE
PMV BLD AUTO: 10.5 FL (ref 8.9–12.7)
POTASSIUM SERPL-SCNC: 6.2 MMOL/L (ref 3.5–5.3)
PROT SERPL-MCNC: 7.7 G/DL (ref 6.4–8.4)
PROT UR STRIP-MCNC: ABNORMAL MG/DL
RBC # BLD AUTO: 5.63 MILLION/UL (ref 3.88–5.62)
RBC #/AREA URNS AUTO: ABNORMAL /HPF
RBC MORPH BLD: NORMAL
SODIUM SERPL-SCNC: 137 MMOL/L (ref 135–147)
SP GR UR STRIP.AUTO: 1.03 (ref 1–1.03)
UROBILINOGEN UR STRIP-ACNC: <2 MG/DL
WBC # BLD AUTO: 10.89 THOUSAND/UL (ref 4.31–10.16)
WBC #/AREA URNS AUTO: ABNORMAL /HPF

## 2022-08-04 PROCEDURE — 99284 EMERGENCY DEPT VISIT MOD MDM: CPT

## 2022-08-04 PROCEDURE — 83690 ASSAY OF LIPASE: CPT | Performed by: EMERGENCY MEDICINE

## 2022-08-04 PROCEDURE — 36415 COLL VENOUS BLD VENIPUNCTURE: CPT | Performed by: EMERGENCY MEDICINE

## 2022-08-04 PROCEDURE — 96366 THER/PROPH/DIAG IV INF ADDON: CPT

## 2022-08-04 PROCEDURE — 85027 COMPLETE CBC AUTOMATED: CPT | Performed by: EMERGENCY MEDICINE

## 2022-08-04 PROCEDURE — 85007 BL SMEAR W/DIFF WBC COUNT: CPT | Performed by: EMERGENCY MEDICINE

## 2022-08-04 PROCEDURE — 81001 URINALYSIS AUTO W/SCOPE: CPT | Performed by: EMERGENCY MEDICINE

## 2022-08-04 PROCEDURE — 96365 THER/PROPH/DIAG IV INF INIT: CPT

## 2022-08-04 PROCEDURE — G1004 CDSM NDSC: HCPCS

## 2022-08-04 PROCEDURE — 80053 COMPREHEN METABOLIC PANEL: CPT | Performed by: EMERGENCY MEDICINE

## 2022-08-04 PROCEDURE — 74177 CT ABD & PELVIS W/CONTRAST: CPT

## 2022-08-04 PROCEDURE — 99284 EMERGENCY DEPT VISIT MOD MDM: CPT | Performed by: EMERGENCY MEDICINE

## 2022-08-04 RX ORDER — FENTANYL CITRATE 50 UG/ML
50 INJECTION, SOLUTION INTRAMUSCULAR; INTRAVENOUS ONCE
Status: DISCONTINUED | OUTPATIENT
Start: 2022-08-04 | End: 2022-08-05

## 2022-08-04 RX ADMIN — SODIUM CHLORIDE, SODIUM LACTATE, POTASSIUM CHLORIDE, AND CALCIUM CHLORIDE 1000 ML: .6; .31; .03; .02 INJECTION, SOLUTION INTRAVENOUS at 22:54

## 2022-08-04 RX ADMIN — IOHEXOL 75 ML: 350 INJECTION, SOLUTION INTRAVENOUS at 23:55

## 2022-08-05 ENCOUNTER — TELEPHONE (OUTPATIENT)
Dept: FAMILY MEDICINE CLINIC | Facility: MEDICAL CENTER | Age: 52
End: 2022-08-05

## 2022-08-05 RX ORDER — OXYCODONE HYDROCHLORIDE AND ACETAMINOPHEN 5; 325 MG/1; MG/1
1 TABLET ORAL
Qty: 5 TABLET | Refills: 0 | Status: SHIPPED | OUTPATIENT
Start: 2022-08-05 | End: 2022-08-10

## 2022-08-05 RX ORDER — OXYCODONE HYDROCHLORIDE AND ACETAMINOPHEN 5; 325 MG/1; MG/1
1 TABLET ORAL ONCE
Status: COMPLETED | OUTPATIENT
Start: 2022-08-05 | End: 2022-08-05

## 2022-08-05 RX ADMIN — OXYCODONE HYDROCHLORIDE AND ACETAMINOPHEN 1 TABLET: 5; 325 TABLET ORAL at 00:20

## 2022-08-05 NOTE — DISCHARGE INSTRUCTIONS
You were seen in the ED today for left-sided abdominal pain  You were found to have a kidney stone lodged in your ureter obstructing your urine flow on the left side  You have been prescribed Percocet to be taken as needed for your pain  Please come back to the ED if you are unable to urinate, if your pain is uncontrolled, or if you are unable to eat or drink  Thank you very much for utilizing the ED this evening

## 2022-08-05 NOTE — TELEPHONE ENCOUNTER
Pt went to the ER last night and was given percocet  He woke up in the middle of the night with his heart racing  He hasn't taken anymore  He has a kidney stone and is wondering if there is anything else he can take for the pain  He is going to drink lots of water to pass the stone  Pt doesn't have any insurance right now

## 2022-08-05 NOTE — ED PROVIDER NOTES
History  Chief Complaint   Patient presents with    Flank Pain     Pt to ED with c/o left flank pain pt described as cramping, denies n/v, denies urinary symptoms, no relief with aleve      This is a 58-year-old male patient with out any significantly related past medical history presenting to the ED today for complaint of abdominal pain  Patient states that he has had left lower quadrant abdominal pain, nonradiating, colicky in nature, with some nausea without any vomiting  He has never had symptoms like this before  His symptoms have been present all day today  He states that he had a similar episode which resolved spontaneously on Friday  He otherwise denies any significantly related symptoms  He denies any alleviating or exacerbating factors  His pain is severe in quality when it comes on  It lasted approximately 2 hours, and then transformed into a dull ache  Patient took 440 mg of naproxen at home which did not improve his symptoms  Flank Pain  Associated symptoms: no chest pain, no chills, no cough, no dysuria, no fever, no hematuria, no shortness of breath, no sore throat and no vomiting        Prior to Admission Medications   Prescriptions Last Dose Informant Patient Reported?  Taking?   escitalopram (LEXAPRO) 10 mg tablet   No No   Sig: TAKE 1 TABLET BY MOUTH EVERY DAY   meloxicam (Mobic) 15 mg tablet   No No   Sig: Take 1 tablet (15 mg total) by mouth daily      Facility-Administered Medications: None       Past Medical History:   Diagnosis Date    Anxiety     Disease of thyroid gland        Past Surgical History:   Procedure Laterality Date    APPENDECTOMY  2015    CHOLECYSTECTOMY      EAR SURGERY      STOMACH SURGERY         Family History   Problem Relation Age of Onset    Liver disease Mother     Cerebral aneurysm Mother     Alcohol abuse Father     Diabetes Brother     No Known Problems Brother     Substance Abuse Daughter     No Known Problems Maternal Grandmother      I have reviewed and agree with the history as documented  E-Cigarette/Vaping     E-Cigarette/Vaping Substances     Social History     Tobacco Use    Smoking status: Never Smoker    Smokeless tobacco: Never Used   Substance Use Topics    Alcohol use: No    Drug use: No       Review of Systems   Constitutional: Negative for chills and fever  HENT: Negative for ear pain and sore throat  Eyes: Negative for pain and visual disturbance  Respiratory: Negative for cough and shortness of breath  Cardiovascular: Negative for chest pain and palpitations  Gastrointestinal: Negative for abdominal pain and vomiting  Genitourinary: Positive for flank pain  Negative for dysuria and hematuria  Musculoskeletal: Negative for arthralgias and back pain  Skin: Negative for color change and rash  Neurological: Negative for seizures and syncope  All other systems reviewed and are negative  Physical Exam  Physical Exam  Vitals and nursing note reviewed  Constitutional:       General: He is not in acute distress  Appearance: Normal appearance  He is well-developed  He is obese  He is not ill-appearing  HENT:      Head: Normocephalic and atraumatic  Right Ear: External ear normal       Left Ear: External ear normal       Nose: Nose normal  No congestion or rhinorrhea  Mouth/Throat:      Mouth: Mucous membranes are moist       Pharynx: Oropharynx is clear  Eyes:      General: No scleral icterus  Conjunctiva/sclera: Conjunctivae normal    Cardiovascular:      Rate and Rhythm: Normal rate and regular rhythm  Pulses: Normal pulses  Heart sounds: Normal heart sounds  No murmur heard  Pulmonary:      Effort: Pulmonary effort is normal  No respiratory distress  Breath sounds: Normal breath sounds  No wheezing or rales  Abdominal:      General: Abdomen is flat  Bowel sounds are normal  There is no distension  Palpations: Abdomen is soft  There is no mass        Tenderness: There is abdominal tenderness (In the left inguinal region)  There is no right CVA tenderness or left CVA tenderness  Musculoskeletal:         General: Normal range of motion  Cervical back: Normal range of motion and neck supple  No tenderness  Right lower leg: No edema  Left lower leg: No edema  Skin:     General: Skin is warm and dry  Capillary Refill: Capillary refill takes less than 2 seconds  Neurological:      General: No focal deficit present  Mental Status: He is alert and oriented to person, place, and time  Mental status is at baseline  Motor: No weakness  Psychiatric:         Mood and Affect: Mood normal          Behavior: Behavior normal          Thought Content: Thought content normal          Judgment: Judgment normal          Vital Signs  ED Triage Vitals [08/04/22 2110]   Temperature Pulse Respirations Blood Pressure SpO2   98 1 °F (36 7 °C) 70 18 154/85 95 %      Temp Source Heart Rate Source Patient Position - Orthostatic VS BP Location FiO2 (%)   Oral Monitor Sitting Left arm --      Pain Score       10 - Worst Possible Pain           Vitals:    08/04/22 2110   BP: 154/85   Pulse: 70   Patient Position - Orthostatic VS: Sitting         Visual Acuity      ED Medications  Medications - No data to display    Diagnostic Studies  Results Reviewed     None                 No orders to display              Procedures  Procedures         ED Course                                             MDM  Number of Diagnoses or Management Options  Ureterolithiasis  Diagnosis management comments: This is a 71-year-old male patient presenting with left-sided inguinal pain  He has had symptoms all day today, colicky in nature, relapsing and remitting, but continuing was a dull ache once his acute sharp pain resolves  He has taken naproxen without improvement in his symptoms  He was offered fentanyl for his pain, but refused    He received Percocet which seemed to improve his pain here in the ED  His physical exam is remarkable for left-sided inguinal tenderness to palpation  He otherwise does not have any abnormalities on his physical exam   His differential diagnosis includes:  Urinary tract infection versus nephro/ureterolithiasis versus infected stone versus other  Patient had a urinalysis consistent with microscopic hematuria, but no evidence of infection  He had a CBC, metabolic panel also which were unremarkable  He received a L of IV fluids here in the ED  He had a CT of the abdomen pelvis showing left-sided ureterolithiasis, 4 5 mm, with some mild hydronephrosis  Patient was considered stable for discharge considering he was tolerating p o , and his pain was improved with Percocet here in the ED  Patient was given strict return precautions with which he agreed to imply  He was discharged in stable condition and felt safe going home  Disposition  Final diagnoses:   None     ED Disposition     None      Follow-up Information    None         Patient's Medications   Discharge Prescriptions    No medications on file       No discharge procedures on file      PDMP Review     None          ED Provider  Electronically Signed by           Christopher Messer MD  08/05/22 7470

## 2022-08-08 ENCOUNTER — TELEPHONE (OUTPATIENT)
Dept: OTHER | Facility: OTHER | Age: 52
End: 2022-08-08

## 2022-08-08 NOTE — TELEPHONE ENCOUNTER
HR=77 bpm, EFDK=846/98 mmhg, SpO2=98.0 %, Resp=17 B/min, EtCO2=26 mmHg, Apnea=3 Seconds, Pain=0, Dunaway=2, Comment=NSR Pt was in the ED on 8/4/2022 for kidney stones   He still has them and was referred to see a Urologist   Please call back to schedule

## 2022-08-09 NOTE — TELEPHONE ENCOUNTER
Spoke to patient and appointment has been scheduled at the [facility] office  Location provided and confirmed

## 2022-08-09 NOTE — PROGRESS NOTES
8/10/2022      Chief Complaint   Patient presents with    Nephrolithiasis    Flank Pain         Assessment and Plan    47 y o  male -- New patient    1  Ureteral stone  - CT (8/4/22) showing mild left sided hydronephrosis with 4 5 obstructing stone in mid left ureter  - Afebrile  - Discussed options for medical expulsive therapy vs ureteroscopy  Risks and benefits of both discussed  - Patient would like to trial medical expulsive therapy  - Flomax, fluids and strain urine  - KUB and US in 4 weeks, return to review  - Call with any questions or concerns in the meantime  - ER precautions for severe pain, fever, chills, nausea, vomiting  - All questions answered; patient understands and agrees with plan      History of Present Illness  Julia Calloway is a 47 y o  male new patient here for evaluation of ureteral stone  Patient presented to ER 8/4/22 with flank pain and imaging at that time showing 4 5 ureteral stone with hydronephrosis  Patient was discharged with medical expulsive therapy and presents today to establish care  Denies history of nephrolithiasis  Denies fever, chills, nausea, vomiting  Pain controlled with pharmacotherapy  Denies seeing urology in the past        Review of Systems   Constitutional: Negative for activity change, appetite change, chills and fever  HENT: Negative for congestion and trouble swallowing  Respiratory: Negative for cough and shortness of breath  Cardiovascular: Negative for chest pain, palpitations and leg swelling  Gastrointestinal: Negative for abdominal pain, constipation, diarrhea, nausea and vomiting  Genitourinary: Negative for difficulty urinating, dysuria, flank pain, frequency, hematuria and urgency  Musculoskeletal: Negative for back pain and gait problem  Skin: Negative for wound  Allergic/Immunologic: Negative for immunocompromised state  Neurological: Negative for dizziness and syncope  Hematological: Does not bruise/bleed easily  Psychiatric/Behavioral: Negative for confusion  All other systems reviewed and are negative  Vitals  Vitals:    08/10/22 1309   BP: 122/90   Pulse: 65   SpO2: 97%   Weight: 99 8 kg (220 lb)   Height: 5' 7" (1 702 m)       Physical Exam  Constitutional:       General: He is not in acute distress  Appearance: Normal appearance  He is not ill-appearing, toxic-appearing or diaphoretic  HENT:      Head: Normocephalic  Nose: No congestion  Eyes:      General: No scleral icterus  Right eye: No discharge  Left eye: No discharge  Conjunctiva/sclera: Conjunctivae normal       Pupils: Pupils are equal, round, and reactive to light  Pulmonary:      Effort: Pulmonary effort is normal    Musculoskeletal:      Cervical back: Normal range of motion  Skin:     General: Skin is warm and dry  Coloration: Skin is not jaundiced or pale  Findings: No bruising, erythema, lesion or rash  Neurological:      General: No focal deficit present  Mental Status: He is alert and oriented to person, place, and time  Mental status is at baseline  Gait: Gait normal    Psychiatric:         Mood and Affect: Mood normal          Behavior: Behavior normal          Thought Content:  Thought content normal          Judgment: Judgment normal            Past History  Past Medical History:   Diagnosis Date    Anxiety     Disease of thyroid gland      Social History     Socioeconomic History    Marital status:      Spouse name: None    Number of children: None    Years of education: None    Highest education level: None   Occupational History    None   Tobacco Use    Smoking status: Never Smoker    Smokeless tobacco: Never Used   Vaping Use    Vaping Use: Never used   Substance and Sexual Activity    Alcohol use: No    Drug use: No    Sexual activity: None   Other Topics Concern    None   Social History Narrative    Caffeine use     Social Determinants of Health Financial Resource Strain: Not on file   Food Insecurity: Not on file   Transportation Needs: Not on file   Physical Activity: Not on file   Stress: Not on file   Social Connections: Not on file   Intimate Partner Violence: Not on file   Housing Stability: Not on file     Social History     Tobacco Use   Smoking Status Never Smoker   Smokeless Tobacco Never Used     Family History   Problem Relation Age of Onset    Liver disease Mother     Cerebral aneurysm Mother     Alcohol abuse Father     Diabetes Brother     No Known Problems Brother     Substance Abuse Daughter     No Known Problems Maternal Grandmother        The following portions of the patient's history were reviewed and updated as appropriate: allergies, current medications, past medical history, past social history, past surgical history and problem list     Results  Recent Results (from the past 1 hour(s))   POCT urine dip    Collection Time: 08/10/22  1:12 PM   Result Value Ref Range    LEUKOCYTE ESTERASE,UA -     NITRITE,UA -     SL AMB POCT UROBILINOGEN 0 2     POCT URINE PROTEIN -      PH,UA 6 0     BLOOD,UA trace     SPECIFIC GRAVITY,UA 1 025     KETONES,UA -     BILIRUBIN,UA -     GLUCOSE, UA -      COLOR,UA Yellow     CLARITY,UA Clear    ]  No results found for: PSA  Lab Results   Component Value Date    GLUCOSE 89 01/15/2016    CALCIUM 9 3 08/04/2022     01/15/2016    K 6 2 (H) 08/04/2022    CO2 26 08/04/2022     08/04/2022    BUN 15 08/04/2022    CREATININE 1 17 08/04/2022     Lab Results   Component Value Date    WBC 10 89 (H) 08/04/2022    HGB 16 0 08/04/2022    HCT 47 7 08/04/2022    MCV 85 08/04/2022     08/04/2022       Neal Ramos PA-C

## 2022-08-10 ENCOUNTER — OFFICE VISIT (OUTPATIENT)
Dept: UROLOGY | Facility: CLINIC | Age: 52
End: 2022-08-10

## 2022-08-10 VITALS
HEIGHT: 67 IN | OXYGEN SATURATION: 97 % | SYSTOLIC BLOOD PRESSURE: 122 MMHG | BODY MASS INDEX: 34.53 KG/M2 | HEART RATE: 65 BPM | WEIGHT: 220 LBS | DIASTOLIC BLOOD PRESSURE: 90 MMHG

## 2022-08-10 DIAGNOSIS — N20.1 URETERAL STONE: Primary | ICD-10-CM

## 2022-08-10 LAB
SL AMB  POCT GLUCOSE, UA: NORMAL
SL AMB LEUKOCYTE ESTERASE,UA: NORMAL
SL AMB POCT BILIRUBIN,UA: NORMAL
SL AMB POCT BLOOD,UA: NORMAL
SL AMB POCT CLARITY,UA: CLEAR
SL AMB POCT COLOR,UA: YELLOW
SL AMB POCT KETONES,UA: NORMAL
SL AMB POCT NITRITE,UA: NORMAL
SL AMB POCT PH,UA: 6
SL AMB POCT SPECIFIC GRAVITY,UA: 1.02
SL AMB POCT URINE PROTEIN: NORMAL
SL AMB POCT UROBILINOGEN: 0.2

## 2022-08-10 PROCEDURE — 81002 URINALYSIS NONAUTO W/O SCOPE: CPT | Performed by: PHYSICIAN ASSISTANT

## 2022-08-10 PROCEDURE — 99204 OFFICE O/P NEW MOD 45 MIN: CPT | Performed by: PHYSICIAN ASSISTANT

## 2022-08-10 RX ORDER — ALFUZOSIN HYDROCHLORIDE 10 MG/1
10 TABLET, EXTENDED RELEASE ORAL DAILY
Qty: 30 TABLET | Refills: 3 | Status: SHIPPED | OUTPATIENT
Start: 2022-08-10

## 2022-08-12 ENCOUNTER — TELEPHONE (OUTPATIENT)
Dept: OTHER | Facility: OTHER | Age: 52
End: 2022-08-12

## 2022-08-12 NOTE — TELEPHONE ENCOUNTER
note from Franklin Moore on 8/10    1  Ureteral stone  - CT (8/4/22) showing mild left sided hydronephrosis with 4 5 obstructing stone in mid left ureter  - Afebrile  - Discussed options for medical expulsive therapy vs ureteroscopy   Risks and benefits of both discussed  - Patient would like to trial medical expulsive therapy  - Flomax, fluids and strain urine  - KUB and US in 4 weeks, return to review  - Call with any questions or concerns in the meantime  - ER precautions for severe pain, fever, chills, nausea, vomiting  - All questions answered; patient understands and agrees with plan

## 2022-08-12 NOTE — TELEPHONE ENCOUNTER
Called and spoke to patient  Informed patient of Celsa Ryan PACs message below  Patient stated he is trying to increase his water intake  Patient stated he has intermittent pain 1 to 2 times a day that last about 1-3 hours  Patient is utilizing a heating pad but has not started the tamsulosin  Patient stated he is worried about dizziness and drop in blood pressure  Informed patient to take the medication at night to avoid dizziness and orthostatic hypotension  Informed patient that will help relax the bladder and try to assist in passing the stone  Informed patient of ER precautions given the weekend with severe pain  Informed patient that I would speak with a provider and see if they advise anything else at this time and would only call back if we had recommendations  Patient verbalized understanding and thankful for call  Spoke to Jose Alejandro Stinson and Celsa Ryan PA-C in the office they do not have any other recommendations  Jose Alejandro Stinson stated she informed patient of this information as well as office visit

## 2022-08-12 NOTE — TELEPHONE ENCOUNTER
Patient calling stating he was seen by Anurag Alegria 08/10 and has a kidney stone, he is wondering what his procedure options are for removal, he states he is aware there is treatment option with lasers and he is looking for more information on that, how long the procedure is, how much time is needed for scheduling, etc  Please advise, [phone]

## 2022-08-12 NOTE — TELEPHONE ENCOUNTER
Medical expulsive therapy was reviewed with patient due to small size of stone  He is to obtain imaging in 4 weeks to assess for passage of stone  If stone remains he would require surgery  If surgery is needed this can be discussed at next visit  If he is having progressively worsening pain he would need sooner visit  If having severe pain over the weekend or fever he would not need to go to the ER  The procedure that would be performed is ureteroscopy with laser lithotripsy and is approximately a 30-60 minute procedure    Typically we get these procedures scheduled pretty quickly for obstructing stones if surgery is needed

## 2022-09-13 ENCOUNTER — TELEPHONE (OUTPATIENT)
Dept: UROLOGY | Facility: CLINIC | Age: 52
End: 2022-09-13

## 2022-09-13 NOTE — TELEPHONE ENCOUNTER
THE [facility] asking for callback from patient, number provided  Please assist in rescheduling his appointment  Patient needs US/KUB done

## 2022-10-12 ENCOUNTER — OFFICE VISIT (OUTPATIENT)
Dept: URGENT CARE | Facility: MEDICAL CENTER | Age: 52
End: 2022-10-12
Payer: COMMERCIAL

## 2022-10-12 VITALS
WEIGHT: 220 LBS | HEIGHT: 67 IN | RESPIRATION RATE: 20 BRPM | BODY MASS INDEX: 34.53 KG/M2 | TEMPERATURE: 98 F | DIASTOLIC BLOOD PRESSURE: 82 MMHG | HEART RATE: 80 BPM | SYSTOLIC BLOOD PRESSURE: 141 MMHG | OXYGEN SATURATION: 99 %

## 2022-10-12 DIAGNOSIS — J01.10 ACUTE NON-RECURRENT FRONTAL SINUSITIS: Primary | ICD-10-CM

## 2022-10-12 PROCEDURE — 99213 OFFICE O/P EST LOW 20 MIN: CPT | Performed by: PHYSICIAN ASSISTANT

## 2022-10-12 RX ORDER — AMOXICILLIN AND CLAVULANATE POTASSIUM 875; 125 MG/1; MG/1
1 TABLET, FILM COATED ORAL EVERY 12 HOURS SCHEDULED
Qty: 14 TABLET | Refills: 0 | Status: SHIPPED | OUTPATIENT
Start: 2022-10-12 | End: 2022-10-19

## 2022-10-12 NOTE — PROGRESS NOTES
[facility]        NAME: Micah Osgood is a 47 y o  male  : 1970    MRN: [id number]  DATE: 2022  TIME: 10:43 AM    Assessment and Plan   Acute non-recurrent frontal sinusitis [J01 10]  1  Acute non-recurrent frontal sinusitis  amoxicillin-clavulanate (AUGMENTIN) 875-125 mg per tablet         Patient Instructions     Sinusitis  Augmentin twice daily   Follow up with PCP in 3-5 days  Proceed to  ER if symptoms worsen  Chief Complaint     Chief Complaint   Patient presents with   • Sinusitis     Headache, sneezing, nasal congestion, facial pain for over a week          History of Present Illness       56-year-old male who presents complaining of cough, congestion, runny nose x7 days, sinus pressure/pain x2 days  Patient states that most of the symptoms are gone except with of head pressure, sinus pain  Denies fevers, chills, shortness of breath  Declined COVID test in the office      Review of Systems   Review of Systems   Constitutional: Negative  HENT: Positive for congestion, sinus pressure, sinus pain and sore throat  Negative for dental problem, drooling, ear pain, postnasal drip, rhinorrhea, trouble swallowing and voice change  Eyes: Negative  Respiratory: Positive for cough  Negative for apnea, choking, chest tightness, shortness of breath, wheezing and stridor  Cardiovascular: Negative  Negative for chest pain           Current Medications       Current Outpatient Medications:   •  amoxicillin-clavulanate (AUGMENTIN) 875-125 mg per tablet, Take 1 tablet by mouth every 12 (twelve) hours for 7 days, Disp: 14 tablet, Rfl: 0  •  escitalopram (LEXAPRO) 10 mg tablet, TAKE 1 TABLET BY MOUTH EVERY DAY, Disp: 30 tablet, Rfl: 3  •  alfuzosin (UROXATRAL) 10 mg 24 hr tablet, Take 1 tablet (10 mg total) by mouth daily, Disp: 30 tablet, Rfl: 3  •  meloxicam (Mobic) 15 mg tablet, Take 1 tablet (15 mg total) by mouth daily (Patient not taking: Reported on 10/12/2022), Disp: 30 tablet, Rfl: 1    Current Allergies     Allergies as of 10/12/2022 - Reviewed 10/12/2022   Allergen Reaction Noted   • Sulfa antibiotics Rash 02/20/2016            The following portions of the patient's history were reviewed and updated as appropriate: allergies, current medications, past family history, past medical history, past social history, past surgical history and problem list      Past Medical History:   Diagnosis Date   • Anxiety    • Disease of thyroid gland        Past Surgical History:   Procedure Laterality Date   • APPENDECTOMY  2015   • CHOLECYSTECTOMY     • EAR SURGERY     • STOMACH SURGERY         Family History   Problem Relation Age of Onset   • Liver disease Mother    • Cerebral aneurysm Mother    • Alcohol abuse Father    • Diabetes Brother    • No Known Problems Brother    • Substance Abuse Daughter    • No Known Problems Maternal Grandmother          Medications have been verified  Objective   /82   Pulse 80   Temp 98 °F (36 7 °C) (Temporal)   Resp 20   Ht 5' 7" (1 702 m)   Wt 99 8 kg (220 lb)   SpO2 99%   BMI 34 46 kg/m²        Physical Exam     Physical Exam  Constitutional:       General: He is not in acute distress  Appearance: He is well-developed  He is not diaphoretic  HENT:      Head: Normocephalic and atraumatic  Right Ear: Hearing, tympanic membrane, ear canal and external ear normal       Left Ear: Hearing, tympanic membrane, ear canal and external ear normal       Nose: Rhinorrhea present  Right Sinus: Maxillary sinus tenderness present  No frontal sinus tenderness  Left Sinus: Maxillary sinus tenderness present  No frontal sinus tenderness  Mouth/Throat:      Pharynx: Uvula midline  Cardiovascular:      Rate and Rhythm: Normal rate and regular rhythm  Heart sounds: Normal heart sounds  Pulmonary:      Effort: Pulmonary effort is normal  No respiratory distress  Breath sounds: Normal breath sounds  No stridor   No wheezing, rhonchi or rales  Chest:      Chest wall: No tenderness  Musculoskeletal:      Cervical back: Normal range of motion and neck supple  Lymphadenopathy:      Cervical: No cervical adenopathy

## 2022-10-12 NOTE — LETTER
October 12, 2022     Patient: Yamini Mckeon   YOB: 1970   Date of Visit: 10/12/2022       To Whom it May Concern:    Yamini Mckeon was seen in my clinic on 10/12/2022  He may return to work on 10/13/2022  If you have any questions or concerns, please don't hesitate to call           Sincerely,          [facility] [place]        CC: No Recipients

## 2022-10-12 NOTE — PATIENT INSTRUCTIONS
Sinusitis  Augmentin twice daily   Follow up with PCP in 3-5 days  Proceed to  ER if symptoms worsen  Rhinosinusitis   WHAT YOU NEED TO KNOW:   Rhinosinusitis (RS) is inflammation or infection of your nasal passages and sinuses  RS is most often caused by a virus but may be caused by bacteria  Acute RS lasts up to 12 weeks  Chronic RS lasts more than 12 weeks  Recurrent RS means you have 4 or more infections in 1 year  DISCHARGE INSTRUCTIONS:   Return to the emergency department if:   You have trouble breathing, or wheezing that is getting worse  You have a stiff neck, a fever, or a bad headache  You cannot open your eye  Your eyeball bulges out, or you cannot move your eye  You are more sleepy than usual, or you notice changes in your ability to think, move, or talk  You have swelling of your forehead or scalp  Call your doctor if:   You have vision changes, such as double vision  Your eye and eyelid are red, swollen, and painful  Your symptoms do not improve after 10 days  You have nausea and are vomiting  Your nose is bleeding  You have questions or concerns about your condition or care  Medicines: Your symptoms may go away on their own  Your healthcare provider may recommend watchful waiting for up to 10 days before starting antibiotics  Antibiotics will not help if the infection is caused by a virus  Check with your provider before you take any over-the-counter medicines  You may need any of the following:  Acetaminophen  decreases pain and fever  It is available without a doctor's order  Ask how much to take and how often to take it  Follow directions  Read the labels of all other medicines you are using to see if they also contain acetaminophen, or ask your doctor or pharmacist  Acetaminophen can cause liver damage if not taken correctly  Do not use more than 4 grams (4,000 milligrams) total of acetaminophen in one day       NSAIDs , such as ibuprofen, help decrease swelling, pain, and fever  This medicine is available with or without a doctor's order  NSAIDs can cause stomach bleeding or kidney problems in certain people  If you take blood thinner medicine, always ask your healthcare provider if NSAIDs are safe for you  Always read the medicine label and follow directions  Nasal steroid sprays  help decrease inflammation in your nose and sinuses  Decongestants  help reduce swelling and drain mucus in the nose and sinuses  They may help you breathe easier  Do not use decongestants for more than 3 days  Antihistamines  help dry mucus in the nose and relieve sneezing  Antibiotics  help treat or prevent a bacterial infection  Self-care:   Rinse your sinuses as directed  Use a sinus rinse device to rinse your nasal passages with a saline (salt water) solution or distilled water  Do not  use tap water  A sinus rinse will help thin the mucus in your nose and rinse away pollen and dirt  It will also help lower swelling so you can breathe normally  Use a humidifier  to increase air moisture in your home  Moist air may make it easier for you to breathe and help decrease your cough  Sleep with your head raised  Place an extra pillow under your head before you go to sleep to help your sinuses drain  Drink liquids as directed  Ask your healthcare provider how much liquid to drink each day and which liquids are best for you  Liquids will thin the mucus in your nose and help it drain  Do not have drinks that contain alcohol or caffeine  Do not smoke, and avoid secondhand smoke  Nicotine and other chemicals in cigarettes and cigars can make your symptoms worse  Ask your healthcare provider for information if you currently smoke and need help to quit  E-cigarettes or smokeless tobacco still contain nicotine  Talk to your healthcare provider before you use these products  Prevent the spread of germs:   Wash your hands often with soap and water    Wash your hands after you use the bathroom, change a child's diaper, or sneeze  Wash your hands before you prepare or eat food  Stay away from people who are sick  Some germs spread easily and quickly through contact  Follow up with your doctor as directed: You may be referred to an ear, nose, and throat specialist  Write down your questions so you remember to ask them during your visits  © Copyright [address] 2022 Information is for End User's use only and may not be sold, redistributed or otherwise used for commercial purposes  All illustrations and images included in CareNotes® are the copyrighted property of A D A M , Inc  or [address] [place], [place] [address]ana   The above information is an  only  It is not intended as medical advice for individual conditions or treatments  Talk to your doctor, nurse or pharmacist before following any medical regimen to see if it is safe and effective for you

## 2022-11-23 DIAGNOSIS — F41.0 PANIC DISORDER: ICD-10-CM

## 2022-11-23 RX ORDER — ESCITALOPRAM OXALATE 10 MG/1
TABLET ORAL
Qty: 30 TABLET | Refills: 3 | Status: SHIPPED | OUTPATIENT
Start: 2022-11-23

## 2022-12-08 ENCOUNTER — APPOINTMENT (OUTPATIENT)
Dept: LAB | Facility: MEDICAL CENTER | Age: 52
End: 2022-12-08

## 2022-12-08 ENCOUNTER — OFFICE VISIT (OUTPATIENT)
Dept: FAMILY MEDICINE CLINIC | Facility: MEDICAL CENTER | Age: 52
End: 2022-12-08

## 2022-12-08 VITALS
TEMPERATURE: 98.1 F | HEIGHT: 67 IN | WEIGHT: 225.4 LBS | DIASTOLIC BLOOD PRESSURE: 80 MMHG | BODY MASS INDEX: 35.38 KG/M2 | HEART RATE: 68 BPM | SYSTOLIC BLOOD PRESSURE: 110 MMHG | RESPIRATION RATE: 16 BRPM

## 2022-12-08 DIAGNOSIS — E66.9 OBESITY (BMI 30.0-34.9): ICD-10-CM

## 2022-12-08 DIAGNOSIS — M53.3 SACROILIAC PAIN: ICD-10-CM

## 2022-12-08 DIAGNOSIS — Z13.1 SCREENING FOR DIABETES MELLITUS: ICD-10-CM

## 2022-12-08 DIAGNOSIS — Z13.220 SCREENING FOR LIPID DISORDERS: ICD-10-CM

## 2022-12-08 DIAGNOSIS — E66.9 OBESITY (BMI 35.0-39.9 WITHOUT COMORBIDITY): ICD-10-CM

## 2022-12-08 DIAGNOSIS — J45.20 MILD INTERMITTENT ASTHMA WITHOUT COMPLICATION: ICD-10-CM

## 2022-12-08 DIAGNOSIS — F41.9 ANXIETY: ICD-10-CM

## 2022-12-08 DIAGNOSIS — R06.83 SNORING: Primary | ICD-10-CM

## 2022-12-08 DIAGNOSIS — Z12.11 SCREENING FOR MALIGNANT NEOPLASM OF COLON: ICD-10-CM

## 2022-12-08 DIAGNOSIS — Z12.5 SCREENING FOR PROSTATE CANCER: ICD-10-CM

## 2022-12-08 LAB
ANION GAP SERPL CALCULATED.3IONS-SCNC: 5 MMOL/L (ref 4–13)
BUN SERPL-MCNC: 11 MG/DL (ref 5–25)
CALCIUM SERPL-MCNC: 8.9 MG/DL (ref 8.3–10.1)
CHLORIDE SERPL-SCNC: 108 MMOL/L (ref 96–108)
CHOLEST SERPL-MCNC: 129 MG/DL
CO2 SERPL-SCNC: 26 MMOL/L (ref 21–32)
CREAT SERPL-MCNC: 0.97 MG/DL (ref 0.6–1.3)
GFR SERPL CREATININE-BSD FRML MDRD: 90 ML/MIN/1.73SQ M
GLUCOSE P FAST SERPL-MCNC: 89 MG/DL (ref 65–99)
HDLC SERPL-MCNC: 41 MG/DL
LDLC SERPL CALC-MCNC: 71 MG/DL (ref 0–100)
POTASSIUM SERPL-SCNC: 4.1 MMOL/L (ref 3.5–5.3)
PSA SERPL-MCNC: 0.6 NG/ML (ref 0–4)
SODIUM SERPL-SCNC: 139 MMOL/L (ref 135–147)
TRIGL SERPL-MCNC: 83 MG/DL

## 2022-12-08 RX ORDER — ALBUTEROL SULFATE 90 UG/1
2 AEROSOL, METERED RESPIRATORY (INHALATION) EVERY 6 HOURS PRN
Qty: 6.7 G | Refills: 3 | Status: SHIPPED | OUTPATIENT
Start: 2022-12-08

## 2022-12-08 NOTE — ASSESSMENT & PLAN NOTE
He is doing well with Lexapro  It has been effective and well-tolerated  He wishes to continue that medication    To new Lexapro, continue routine checkups

## 2022-12-08 NOTE — ASSESSMENT & PLAN NOTE
BMI Counseling: Body mass index is 35 3 kg/m²  The BMI is above normal  Nutrition recommendations include reducing portion sizes and consuming healthier snacks  Exercise recommendations include moderate aerobic physical activity for 150 minutes/week

## 2022-12-08 NOTE — PROGRESS NOTES
Name: Guero King      : 1970      MRN: [id number]  Encounter Provider: Bradley Trejo MD  Encounter Date: 2022   Encounter department: [address] [place] [address]    Assessment & Plan     1  Snoring  Assessment & Plan:  He is snoring  He does not feel refreshed in the morning  We will refer him to sleep medicine  Orders:  -     Ambulatory Referral to Sleep Medicine; Future    2  Screening for malignant neoplasm of colon  -     Ambulatory referral to Colorectal Surgery; Future    3  Obesity (BMI 35 0-39 9 without comorbidity)  Assessment & Plan:  BMI Counseling: Body mass index is 35 3 kg/m²  The BMI is above normal  Nutrition recommendations include reducing portion sizes and consuming healthier snacks  Exercise recommendations include moderate aerobic physical activity for 150 minutes/week  Orders:  -     Ambulatory Referral to Weight Management; Future    4  Mild intermittent asthma without complication  -     albuterol (Proventil HFA) 90 mcg/act inhaler; Inhale 2 puffs every 6 (six) hours as needed for wheezing    5  Obesity (BMI 30 0-34  9)  Assessment & Plan:  BMI Counseling: Body mass index is 35 3 kg/m²  The BMI is above normal  Nutrition recommendations include reducing portion sizes and consuming healthier snacks  Exercise recommendations include moderate aerobic physical activity for 150 minutes/week  6  Sacroiliac pain  Assessment & Plan:  He tells me that Aleve works well for him  Continue Aleve, continue as needed ice or heat  7  Anxiety  Assessment & Plan:  He is doing well with Lexapro  It has been effective and well-tolerated  He wishes to continue that medication    To new Lexapro, continue routine checkups  Subjective      HPI  Review of Systems   Constitutional: Positive for fatigue  Negative for activity change and fever     HENT: Negative for congestion, ear discharge, ear pain, postnasal drip, rhinorrhea, sinus pain, sneezing and sore throat  Eyes: Negative for photophobia, pain, discharge and redness  Respiratory: Negative for apnea, cough, shortness of breath and wheezing  Cardiovascular: Negative for chest pain and palpitations  Gastrointestinal: Negative for abdominal pain, blood in stool, constipation, diarrhea, nausea and vomiting  Endocrine: Negative for polydipsia, polyphagia and polyuria  Genitourinary: Negative for decreased urine volume, difficulty urinating, dysuria, frequency, penile discharge, penile pain and urgency  Musculoskeletal: Positive for back pain  Negative for arthralgias, gait problem, joint swelling and neck pain  Skin: Negative for color change and rash  Neurological: Negative for dizziness, tremors, seizures, weakness and headaches  Psychiatric/Behavioral: Negative for agitation and sleep disturbance  The patient is not nervous/anxious  Current Outpatient Medications on File Prior to Visit   Medication Sig   • escitalopram (LEXAPRO) 10 mg tablet TAKE 1 TABLET BY MOUTH EVERY DAY   • [DISCONTINUED] alfuzosin (UROXATRAL) 10 mg 24 hr tablet Take 1 tablet (10 mg total) by mouth daily   • [DISCONTINUED] meloxicam (Mobic) 15 mg tablet Take 1 tablet (15 mg total) by mouth daily (Patient not taking: Reported on 10/12/2022)       Objective     /80 (Cuff Size: Large)   Pulse 68   Temp 98 1 °F (36 7 °C)   Resp 16   Ht 5' 7" (1 702 m)   Wt 102 kg (225 lb 6 4 oz)   BMI 35 30 kg/m²     Physical Exam  Vitals and nursing note reviewed  Constitutional:       General: He is not in acute distress  Appearance: Normal appearance  He is well-developed  He is not ill-appearing  HENT:      Head: Normocephalic and atraumatic  Right Ear: Tympanic membrane, ear canal and external ear normal       Left Ear: Tympanic membrane, ear canal and external ear normal       Nose: Nose normal  No congestion or rhinorrhea        Mouth/Throat:      Mouth: Mucous membranes are moist    Eyes:      General: Lids are normal       Extraocular Movements: Extraocular movements intact  Conjunctiva/sclera: Conjunctivae normal       Pupils: Pupils are equal, round, and reactive to light  Neck:      Thyroid: No thyromegaly  Vascular: No carotid bruit  Cardiovascular:      Rate and Rhythm: Normal rate and regular rhythm  Pulses: Normal pulses  Heart sounds: Normal heart sounds, S1 normal and S2 normal  No murmur heard  Pulmonary:      Effort: Pulmonary effort is normal  No respiratory distress  Breath sounds: Normal breath sounds  No wheezing or rales  Abdominal:      General: Bowel sounds are normal       Palpations: Abdomen is soft  There is no mass  Tenderness: There is no abdominal tenderness  Musculoskeletal:         General: Normal range of motion  Cervical back: Normal range of motion and neck supple  Lymphadenopathy:      Cervical: No cervical adenopathy  Skin:     General: Skin is warm and dry  Coloration: Skin is not pale  Findings: No rash  Neurological:      Mental Status: He is alert and oriented to person, place, and time  Cranial Nerves: No cranial nerve deficit  Sensory: No sensory deficit  Deep Tendon Reflexes: Reflexes are normal and symmetric  Psychiatric:         Behavior: Behavior normal  Behavior is cooperative  Thought Content:  Thought content normal          Judgment: Judgment normal        Gurpreet Nagy MD

## 2022-12-20 ENCOUNTER — OFFICE VISIT (OUTPATIENT)
Dept: SLEEP CENTER | Facility: CLINIC | Age: 52
End: 2022-12-20

## 2022-12-20 VITALS
WEIGHT: 219 LBS | HEIGHT: 67 IN | DIASTOLIC BLOOD PRESSURE: 84 MMHG | SYSTOLIC BLOOD PRESSURE: 114 MMHG | BODY MASS INDEX: 34.37 KG/M2

## 2022-12-20 DIAGNOSIS — R06.83 SNORING: ICD-10-CM

## 2022-12-20 DIAGNOSIS — R29.818 SUSPECTED SLEEP APNEA: Primary | ICD-10-CM

## 2022-12-20 DIAGNOSIS — F41.0 PANIC DISORDER: ICD-10-CM

## 2022-12-20 DIAGNOSIS — E66.9 OBESITY (BMI 35.0-39.9 WITHOUT COMORBIDITY): ICD-10-CM

## 2022-12-20 RX ORDER — ESCITALOPRAM OXALATE 10 MG/1
TABLET ORAL
Qty: 90 TABLET | Refills: 2 | Status: SHIPPED | OUTPATIENT
Start: 2022-12-20

## 2022-12-20 NOTE — PATIENT INSTRUCTIONS
Please get your home sleep study as soon as possible  If it does show that you have sleep apnea, will order you CPAP and follow back with you 31 to 91 days after you start treatment  If you do not have sleep apnea on your home sleep study, it is possible a diagnostic study would be ordered  Nursing Support:  When: Monday through Friday 7A-5PM except holidays  Where: Our direct line is [phone]  If you are having a true emergency please call 911  In the event that the line is busy or it is after hours please leave a voice message and we will return your call  Please speak clearly, leaving your full name, birth date, best number to reach you and the reason for your call  Medication refills: We will need the name of the medication, the dosage, the ordering provider, whether you get a 30 or 90 day refill, and the pharmacy name and address  Medications will be ordered by the provider only  Nurses cannot call in prescriptions  Please allow 7 days for medication refills  Physician requested updates: If your provider requested that you call with an update after starting medication, please be ready to provide us the medication and dosage, what time you take your medication, the time you attempt to fall asleep, time you fall asleep, when you wake up, and what time you get out of bed  Sleep Study Results: We will contact you with sleep study results and/or next steps after the physician has reviewed your testing

## 2022-12-20 NOTE — ASSESSMENT & PLAN NOTE
Patient states he has a referral to weight management and will attempt weight loss  He understands a weight loss can improve his overall health and improve or resolve obstructive sleep apnea if he is diagnosed

## 2022-12-20 NOTE — PROGRESS NOTES
Consultation - [facility], 1970, MRN: [id number]    12/20/2022        Reason for Consult / Principal Problem:    Obstructive Sleep Apnea  Obesity       Thank you for the opportunity of participating in the evaluation and care of this patient in the Sleep Clinic at [facility]  Subjective:     HPI: Libertad Ruiz is a 47y o  year old obese male presents today upon referral from his PCP for excessive daytime sleepiness and loud snoring  Patient states his symptoms have been ongoing for the past 15 to 20 years  He states he gained approximately 30 pounds over the last 7 to 8 years family members have noted loud snoring and occasional apneic episodes  He states he never saw a sleep doctor previously or had a sleep study  He states he would like to feel more energized during the day and would like to have testing for sleep apnea      Comorbid conditions:  Panic disorder, anxiety    Review of Systems      Genitourinary none   Cardiology none   Gastrointestinal none   Neurology none   Constitutional fatigue   Integumentary none   Psychiatry anxiety and depression   Musculoskeletal joint pain and back pain   Pulmonary none   ENT ringing in ears   Endocrine none   Hematological none         Sleep Study Results:  No prior study    CPAP Equipment:  Never used     Employment:  , 7 am -3 pm, 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, 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    Sleep Schedule:       Bedtime: 10 PM on work nights, 11 PM on days off      Latency: Approximately 1 hour      Wakeup time: 6 AM on workdays and 9 AM on days off    Awakenings:       Frequency:  once      Causes:  urination      Duration:  30-40 minutes     Daytime Sleepiness / Inappropriate Sleep:       Most severe:  Mornings       Naps :  No naps      Inappropriate drowsiness / sleep:  Not usually     Snoring:  Loud snoring per family    Apnea:  Noted by a friend     Change in Weight:  Gained 30 pounds over last 7-8 years     Restless Leg Syndrome:  No clinical symptoms consistent with this diagnosis     Other Complaints:   No reports of sleep walking, sleep talking, sleep paralysis or hallucinations surrounding sleep  Denies waking up with headaches, bruxism, and dry mouth  Social History:      Caffeine:  2-3 12oz canDr Gabriel lópez      Tobacco:   reports that he has never smoked  He has never used smokeless tobacco      E-cig/Vaping:    E-Cigarette/Vaping   • E-Cigarette Use Never User       E-Cigarette/Vaping Substances   • Nicotine No    • THC No    • CBD No    • Flavoring No    • Other No    • Unknown No          Alcohol:   reports no history of alcohol use  Drugs:   reports no history of drug use  The review of systems and following portions of the patient's history were reviewed and updated as appropriate: allergies, current medications, past family history, past medical history, past social history, past surgical history and problem list         Objective:       Vitals:    12/20/22 1425   BP: 114/84   BP Location: Right arm   Patient Position: Sitting   Cuff Size: Large   Weight: 99 3 kg (219 lb)   Height: 5' 7" (1 702 m)     Body mass index is 34 3 kg/m²  Neck Circumference: 17  Haugen Sleepiness Scale:  Total score: 6      Current Outpatient Medications:   •  albuterol (Proventil HFA) 90 mcg/act inhaler, Inhale 2 puffs every 6 (six) hours as needed for wheezing, Disp: 6 7 g, Rfl: 3  •  escitalopram (LEXAPRO) 10 mg tablet, TAKE 1 TABLET BY MOUTH EVERY DAY, Disp: 90 tablet, Rfl: 2    Physical Exam  General Appearance:   Alert, cooperative, no distress, appears stated age, obese     Head:   Normocephalic, without obvious abnormality, atraumatic     Eyes:   PERRL, conjunctiva/corneas clear          Nose:  Nares normal, septum midline, mucosa normal, no drainage or sinus tenderness           Throat:  Lips, teeth and gums normal; tongue normal in size and midline in position; mucosa moist, uvula thick,narrowed airway, tonsils not visualized, Mallampati class 3-4       Neck:  Supple, symmetrical, trachea midline, no adenopathy; no thyromegaly noted, no carotid bruit or JVD     Lungs:      Clear to auscultation bilaterally, respirations unlabored     Heart:   Regular rate and rhythm, S1 and S2 normal, no murmur, rub or gallop       Extremities:  Extremities normal, atraumatic, no cyanosis or edema       Skin:  Skin color, texture, turgor normal, no rashes or lesions       Neurologic:  No focal deficits noted  ASSESSMENT / PLAN     1  Suspected sleep apnea  Assessment & Plan:  I believe it is likely the patient has undiagnosed and untreated obstructive sleep apnea due to his excessive daytime sleepiness and loud snoring with witnessed apneic episodes  A home sleep study was ordered today  If the sleep study shows obstructive sleep apnea, CPAP will be ordered for him  He will then follow-up 31 to 91 days after starting treatment  We also discussed other possible treatments such as an oral appliance, UPPP surgery and inspire  Orders:  -     Home Study; Future; Expected date: 12/20/2022    2  Snoring  Assessment & Plan:  Loud snoring could potentially be a sign of obstructive sleep apnea  Home sleep study was ordered today  Orders:  -     Ambulatory Referral to Sleep Medicine  -     Home Study; Future; Expected date: 12/20/2022    3  Obesity (BMI 35 0-39 9 without comorbidity)  Assessment & Plan:  Patient states he has a referral to weight management and will attempt weight loss  He understands a weight loss can improve his overall health and improve or resolve obstructive sleep apnea if he is diagnosed  Orders:  -     Home Study; Future; Expected date: 12/20/2022         Counseling / Coordination of Care  Total clinic time spent today 50 minutes in chart review, face-to-face time spent with the patient, coordination of care and documentation      A description of the counseling / coordination of care:     diagnostic results, instructions for management, risk factor reductions, prognosis, patient and family education, impressions, risks and benefits of treatment options and importance of compliance with treatment    The following instructions have been given to the patient today:    Patient Instructions   Please get your home sleep study as soon as possible  If it does show that you have sleep apnea, will order you CPAP and follow back with you 31 to 91 days after you start treatment  If you do not have sleep apnea on your home sleep study, it is possible a diagnostic study would be ordered  Nursing Support:  When: Monday through Friday 7A-5PM except holidays  Where: Our direct line is [phone]  If you are having a true emergency please call 911  In the event that the line is busy or it is after hours please leave a voice message and we will return your call  Please speak clearly, leaving your full name, birth date, best number to reach you and the reason for your call  Medication refills: We will need the name of the medication, the dosage, the ordering provider, whether you get a 30 or 90 day refill, and the pharmacy name and address  Medications will be ordered by the provider only  Nurses cannot call in prescriptions  Please allow 7 days for medication refills  Physician requested updates: If your provider requested that you call with an update after starting medication, please be ready to provide us the medication and dosage, what time you take your medication, the time you attempt to fall asleep, time you fall asleep, when you wake up, and what time you get out of bed  Sleep Study Results: We will contact you with sleep study results and/or next steps after the physician has reviewed your testing               TOVA Forte 15

## 2022-12-20 NOTE — ASSESSMENT & PLAN NOTE
Loud snoring could potentially be a sign of obstructive sleep apnea  Home sleep study was ordered today

## 2022-12-20 NOTE — ASSESSMENT & PLAN NOTE
I believe it is likely the patient has undiagnosed and untreated obstructive sleep apnea due to his excessive daytime sleepiness and loud snoring with witnessed apneic episodes  A home sleep study was ordered today  If the sleep study shows obstructive sleep apnea, CPAP will be ordered for him  He will then follow-up 31 to 91 days after starting treatment  We also discussed other possible treatments such as an oral appliance, UPPP surgery and inspire

## 2023-01-05 ENCOUNTER — CONSULT (OUTPATIENT)
Dept: BARIATRICS | Facility: CLINIC | Age: 53
End: 2023-01-05

## 2023-01-05 VITALS
SYSTOLIC BLOOD PRESSURE: 130 MMHG | DIASTOLIC BLOOD PRESSURE: 84 MMHG | WEIGHT: 218.1 LBS | HEIGHT: 67 IN | RESPIRATION RATE: 16 BRPM | HEART RATE: 64 BPM | BODY MASS INDEX: 34.23 KG/M2

## 2023-01-05 DIAGNOSIS — R73.01 ELEVATED FASTING GLUCOSE: ICD-10-CM

## 2023-01-05 DIAGNOSIS — E03.9 HYPOTHYROIDISM: ICD-10-CM

## 2023-01-05 DIAGNOSIS — E66.9 OBESITY (BMI 35.0-39.9 WITHOUT COMORBIDITY): Primary | ICD-10-CM

## 2023-01-05 DIAGNOSIS — Z12.11 ENCOUNTER FOR SCREENING COLONOSCOPY: ICD-10-CM

## 2023-01-05 NOTE — PROGRESS NOTES
Assessment/Plan:  Lisa Farooq was seen today for consult  Diagnoses and all orders for this visit:    Obesity (BMI 35 0-39 9 without comorbidity)  -     Ambulatory Referral to Weight Management  Not a candidate for Topamax due to kidney stone  Not a candidate for Phentermine due to anxiety issues  Discussed soda addiction start cutting down to 2 cans daily   Check labs before considering meds for weight loss  Uncontrolled thyroid fc   Encounter for screening colonoscopy  -     Ambulatory referral for colon cancer education; Future    Hypothyroidism  -     TSH w/Reflex; Future    Elevated fasting glucose  -     HEMOGLOBIN A1C W/ EAG ESTIMATION; Future       Had kidney stones- advised to cut out soda and drink more water  Start drinking only 2 cans per day   Can have coffee    Obesity:   Weight not at goal and patient tried more than 6 months to lose weight and was not able to achieve a meaningful weight loss above 5%  - Discussed options of [facility] Program and Conservative Program and the role of weight loss medications  - Patient is interested in pursuing Conservative Program  - Initial weight loss goal of 5-10% weight loss for improved health  - Weight loss can improve patient's co-morbid conditions and/or prevent weight-related complications  Nutrition prescription:  • Calorie goal: 1700kcal- works hard physically during daytime  • Motivational interview performed and patient noted changes to work on until next visit  • Hydration: 64oz fluid, no sugary drinks  • Goal 3 meals per day  • Food log (ie ) www [email or web address],baritastic, lose it or preferred nicole  • Increase physical activity by 10 minutes daily  Patient denies personal and family history of  pancreatitis, thyroid cancer, MEN-2 tumors  Denies any hx of glaucoma, seizures, kidney stones, gallstones  Denies Hx of CAD, PAD, palpitations, arrhythmia     Denies uncontrolled anxiety or depression, suicidal behavior or thinking , has insomnia / sleep disturbance  Return in 6 weeks    Subjective:   Chief Complaint   Patient presents with   • Consult     Initial visit with german       Patient ID: Giovany Cintron  is a 47 y o  male with excess weight/obesity here to pursue weight management  Previous notes and records have been reviewed  HPI  Wt Readings from Last 20 Encounters:   01/05/23 98 9 kg (218 lb 1 6 oz)   12/20/22 99 3 kg (219 lb)   12/08/22 102 kg (225 lb 6 4 oz)   10/12/22 99 8 kg (220 lb)   08/10/22 99 8 kg (220 lb)   04/13/22 96 6 kg (213 lb)   02/12/21 95 3 kg (210 lb)   02/04/20 98 7 kg (217 lb 8 oz)   12/11/19 98 4 kg (217 lb)   02/26/19 95 5 kg (210 lb 8 oz)   06/01/16 88 1 kg (194 lb 2 1 oz)   02/20/16 88 kg (194 lb)   02/17/16 90 8 kg (200 lb 2 1 oz)   01/22/16 91 2 kg (201 lb 2 1 oz)   01/13/16 91 kg (200 lb 8 9 oz)   07/09/15 86 2 kg (190 lb)   02/07/14 86 4 kg (190 lb 6 1 oz)   08/09/13 88 9 kg (196 lb)   06/14/13 89 kg (196 lb 4 oz)   09/04/12 85 8 kg (189 lb 4 oz)     Obesity/Excess Weight:  Severity:   Onset:  10 years ago had panic attack and placed on Zoloft gained 15lbs in 1 mo   Modifiers: Diet and Exercise  Contributing factors:  Insufficient Physical Activity and Stress/Emotional Eating  Associated symptoms: comorbid conditions    B-skips during weekdays, coffee  S-  L- pretzels   S-  D-3 hot dogs  S-  Hydration: dr pepper little water   Alcohol: no  Smoking:no  Exercise: all day at work moving   Occupation: construction  Sleep: not well  STOP bang: will do a sleep study    Past Medical History:   Diagnosis Date   • Anxiety    • Disease of thyroid gland      Past Surgical History:   Procedure Laterality Date   • APPENDECTOMY  2015   • CHOLECYSTECTOMY     • EAR SURGERY     • STOMACH SURGERY       The following portions of the patient's history were reviewed and updated as appropriate: allergies, current medications, past family history, past medical history, past social history, past surgical history, and problem list     ROS:  Review of Systems   Constitutional: Negative for activity change  Fatigue  HENT: Negative for trouble swallowing  Respiratory: Negative for shortness of breath  Cardiovascular: Negative for chest pain, edema  Gastrointestinal: Negative for abdominal pain, nausea and vomiting, acid reflux, constipation/diarrhea  Endocrine: negative for heat /cold intolerance  Genitourinary: Negative for difficulty urinating  Musculoskeletal: Negative for gait problem and myalgias  Psychiatric/Behavioral: Negative for behavioral problems including anxiety /depression  Objective:  /84 (BP Location: Left arm, Patient Position: Sitting, Cuff Size: Large)   Pulse 64   Resp 16   Ht 5' 7" (1 702 m)   Wt 98 9 kg (218 lb 1 6 oz)   BMI 34 16 kg/m²   Constitutional: Well-developed, well-nourished and Obese Body mass index is 34 16 kg/m²  Gerhardt Sep Alert, cooperative  HEENT: No conjunctival injection  No thyroid masses  Pulmonary: No increased work of breathing or signs of respiratory distress  Clear respiratory sounds  CV: Well-perfused, Regular rate and rhythm, no murmurs  Vascular: no peripheral edema  GI: Abdomen obese, Non-distended  Not tender  MSK: no sarcopenia noted   Neuro: Oriented to person, place and time  Normal Speech  Normal gait  Psych: Normal affect and mood  Normal thought process, no delusions     Labs and Imaging  Recent labs and imaging have been personally reviewed    Lab Results   Component Value Date    WBC 10 89 (H) 08/04/2022    HGB 16 0 08/04/2022    HCT 47 7 08/04/2022    MCV 85 08/04/2022     08/04/2022     Lab Results   Component Value Date     01/15/2016    SODIUM 139 12/08/2022    K 4 1 12/08/2022     12/08/2022    CO2 26 12/08/2022    ANIONGAP 6 05/25/2014    AGAP 5 12/08/2022    BUN 11 12/08/2022    CREATININE 0 97 12/08/2022    GLUC 143 (H) 08/04/2022    GLUF 89 12/08/2022    CALCIUM 8 9 12/08/2022    AST 42 (H) 08/04/2022    ALT 45 08/04/2022 ALKPHOS 42 08/04/2022    PROT 7 5 05/23/2014    TP 7 7 08/04/2022    BILITOT 1 1 (H) 05/23/2014    TBILI 1 32 (H) 08/04/2022    EGFR 90 12/08/2022     No results found for: HGBA1C  Lab Results   Component Value Date    RLX7FNKKNSAY 10 800 (H) 02/06/2020     Lab Results   Component Value Date    CHOLESTEROL 129 12/08/2022     Lab Results   Component Value Date    HDL 41 12/08/2022     Lab Results   Component Value Date    TRIG 83 12/08/2022     Lab Results   Component Value Date    LDLCALC 71 12/08/2022

## 2023-01-25 ENCOUNTER — HOSPITAL ENCOUNTER (OUTPATIENT)
Dept: SLEEP CENTER | Facility: CLINIC | Age: 53
Discharge: HOME | End: 2023-01-25

## 2023-01-25 DIAGNOSIS — R06.83 SNORING: ICD-10-CM

## 2023-01-25 DIAGNOSIS — E66.9 OBESITY (BMI 35.0-39.9 WITHOUT COMORBIDITY): ICD-10-CM

## 2023-01-25 DIAGNOSIS — R29.818 SUSPECTED SLEEP APNEA: ICD-10-CM

## 2023-01-27 NOTE — PROGRESS NOTES
Home Sleep Study Documentation    HOME STUDY DEVICE: Noxturnal no                                           Amanda G3 yes      Pre-Sleep Home Study:    Set-up and instructions performed by: Betty Mcgarry performed demonstration for Patient: yes    Return demonstration performed by Patient: yes    Written instructions provided to Patient: yes    Patient signed consent form: yes        Post-Sleep Home Study:    Additional comments by Patient: none    Home Sleep Study Failed:no:    Failure reason: N/A    Reported or Detected: N/A    Scored by:  MARIA VICTORIA Meyers

## 2023-01-31 DIAGNOSIS — G47.33 OSA (OBSTRUCTIVE SLEEP APNEA): Primary | ICD-10-CM

## 2023-02-01 ENCOUNTER — TELEPHONE (OUTPATIENT)
Dept: SLEEP CENTER | Facility: CLINIC | Age: 53
End: 2023-02-01

## 2023-02-01 LAB
DME PARACHUTE DELIVERY DATE REQUESTED: NORMAL
DME PARACHUTE DELIVERY NOTE: NORMAL
DME PARACHUTE ITEM DESCRIPTION: NORMAL
DME PARACHUTE ORDER STATUS: NORMAL
DME PARACHUTE SUPPLIER NAME: NORMAL
DME PARACHUTE SUPPLIER PHONE: NORMAL

## 2023-02-01 NOTE — TELEPHONE ENCOUNTER
----- Message from Michael Wright PA-C sent at 1/31/2023 12:49 PM EST -----  Please inform the patient he has mild obstructive sleep apnea  Treatment is recommended for obstructive sleep apnea, auto-CPAP at the range of 5-15 cm h20 is recommended  CPAP was ordered today  Please have patient start treatment as soon as possible    Follow-up 31 to 91 days after starting treatment

## 2023-02-07 NOTE — TELEPHONE ENCOUNTER
Called patient and advised of sleep study results  Patient would like DME set up in the [address] [facility] store  Rx for CPAP and clinical information sent to [facility] via [facility]  Scheduled compliance follow up 4/11/23  Advised patient that per insurance requirements, this appointment needs to 31-90 days after he starts using CPAP  Patient verbalized understanding  no

## 2023-02-08 ENCOUNTER — TELEPHONE (OUTPATIENT)
Dept: BARIATRICS | Facility: CLINIC | Age: 53
End: 2023-02-08

## 2023-02-21 LAB
DME PARACHUTE DELIVERY DATE EXPECTED: NORMAL
DME PARACHUTE DELIVERY DATE REQUESTED: NORMAL
DME PARACHUTE DELIVERY NOTE: NORMAL
DME PARACHUTE ITEM DESCRIPTION: NORMAL
DME PARACHUTE ORDER STATUS: NORMAL
DME PARACHUTE SUPPLIER NAME: NORMAL
DME PARACHUTE SUPPLIER PHONE: NORMAL

## 2023-03-08 ENCOUNTER — OFFICE VISIT (OUTPATIENT)
Dept: FAMILY MEDICINE CLINIC | Facility: MEDICAL CENTER | Age: 53
End: 2023-03-08

## 2023-03-08 VITALS
RESPIRATION RATE: 16 BRPM | HEIGHT: 67 IN | DIASTOLIC BLOOD PRESSURE: 70 MMHG | TEMPERATURE: 97.5 F | WEIGHT: 226 LBS | BODY MASS INDEX: 35.47 KG/M2 | HEART RATE: 72 BPM | SYSTOLIC BLOOD PRESSURE: 118 MMHG

## 2023-03-08 DIAGNOSIS — M53.3 SACROILIAC PAIN: ICD-10-CM

## 2023-03-08 DIAGNOSIS — F41.9 ANXIETY: ICD-10-CM

## 2023-03-08 DIAGNOSIS — G47.33 OSA (OBSTRUCTIVE SLEEP APNEA): Primary | ICD-10-CM

## 2023-03-08 DIAGNOSIS — E66.9 OBESITY (BMI 35.0-39.9 WITHOUT COMORBIDITY): ICD-10-CM

## 2023-03-08 PROBLEM — R06.83 SNORING: Status: RESOLVED | Noted: 2022-12-08 | Resolved: 2023-03-08

## 2023-03-08 PROBLEM — R29.818 SUSPECTED SLEEP APNEA: Status: RESOLVED | Noted: 2022-12-20 | Resolved: 2023-03-08

## 2023-03-08 NOTE — ASSESSMENT & PLAN NOTE
He had a study and found that he had mild RAUL  They recommended CPAP  He is going to pick it up in the next couple weeks

## 2023-03-08 NOTE — PROGRESS NOTES
Name: Shira Mart      : 1970      MRN: [id number]  Encounter Provider: Kaitlynn Cha MD  Encounter Date: 3/8/2023   Encounter department: [address] [place] [address]    Assessment & Plan     1  RAUL (obstructive sleep apnea)  Assessment & Plan:  He had a study and found that he had mild RAUL  They recommended CPAP  He is going to pick it up in the next couple weeks  2  Obesity (BMI 35 0-39 9 without comorbidity)  Assessment & Plan:  I sent him to weight management, however he did not feel like he clicked with the provider  I will refer him back to weight management and he will ask for another provider  Orders:  -     Ambulatory Referral to Weight Management; Future    3  Sacroiliac pain  Assessment & Plan: It is ongoing  He is not complaining of it today until I ask about it  He should lose some weight and do some core strengthening  4  Anxiety  Assessment & Plan:  Lexapro is working well for him and is well-tolerated  Continue Lexapro, continue following up with me regularly  Subjective      HPI  Review of Systems   Constitutional: Negative  HENT: Negative  Eyes: Negative  Respiratory: Negative  Cardiovascular: Negative  Gastrointestinal: Negative  Genitourinary: Negative  Musculoskeletal: Positive for back pain  Negative for gait problem  Neurological: Negative  Psychiatric/Behavioral: Negative  Negative for dysphoric mood and self-injury  The patient is not nervous/anxious  Current Outpatient Medications on File Prior to Visit   Medication Sig   • albuterol (Proventil HFA) 90 mcg/act inhaler Inhale 2 puffs every 6 (six) hours as needed for wheezing   • escitalopram (LEXAPRO) 10 mg tablet TAKE 1 TABLET BY MOUTH EVERY DAY       Objective     /70 (Cuff Size: Large)   Pulse 72   Temp 97 5 °F (36 4 °C)   Resp 16   Ht 5' 7" (1 702 m)   Wt 103 kg (226 lb)   BMI 35 40 kg/m²     Physical Exam  Vitals and nursing note reviewed  Constitutional:       General: He is not in acute distress  Appearance: Normal appearance  He is well-developed  He is not ill-appearing  HENT:      Head: Normocephalic and atraumatic  Right Ear: External ear normal       Left Ear: External ear normal       Nose: Nose normal  No congestion or rhinorrhea  Mouth/Throat:      Mouth: Mucous membranes are moist       Pharynx: No posterior oropharyngeal erythema  Eyes:      General: Lids are normal       Extraocular Movements: Extraocular movements intact  Conjunctiva/sclera: Conjunctivae normal       Pupils: Pupils are equal, round, and reactive to light  Neck:      Thyroid: No thyromegaly  Vascular: No carotid bruit  Cardiovascular:      Rate and Rhythm: Normal rate and regular rhythm  Pulses: Normal pulses  Heart sounds: Normal heart sounds, S1 normal and S2 normal  No murmur heard  Pulmonary:      Effort: Pulmonary effort is normal  No respiratory distress  Breath sounds: Normal breath sounds  No wheezing or rales  Abdominal:      General: Bowel sounds are normal       Palpations: Abdomen is soft  There is no mass  Tenderness: There is no abdominal tenderness  Musculoskeletal:         General: Normal range of motion  Cervical back: Normal range of motion and neck supple  Lymphadenopathy:      Cervical: No cervical adenopathy  Skin:     General: Skin is warm and dry  Coloration: Skin is not pale  Findings: No rash  Neurological:      Mental Status: He is alert and oriented to person, place, and time  Cranial Nerves: No cranial nerve deficit  Sensory: No sensory deficit  Deep Tendon Reflexes: Reflexes are normal and symmetric  Psychiatric:         Behavior: Behavior normal  Behavior is cooperative  Thought Content:  Thought content normal          Judgment: Judgment normal        Clara Tavarez MD

## 2023-03-08 NOTE — ASSESSMENT & PLAN NOTE
I sent him to weight management, however he did not feel like he clicked with the provider  I will refer him back to weight management and he will ask for another provider

## 2023-03-08 NOTE — ASSESSMENT & PLAN NOTE
It is ongoing  He is not complaining of it today until I ask about it  He should lose some weight and do some core strengthening

## 2023-03-08 NOTE — ASSESSMENT & PLAN NOTE
Lexapro is working well for him and is well-tolerated  Continue Lexapro, continue following up with me regularly

## 2023-03-09 DIAGNOSIS — F41.0 PANIC DISORDER: ICD-10-CM

## 2023-03-09 RX ORDER — ESCITALOPRAM OXALATE 10 MG/1
TABLET ORAL
Qty: 90 TABLET | Refills: 3 | Status: SHIPPED | OUTPATIENT
Start: 2023-03-09

## 2023-05-01 ENCOUNTER — OFFICE VISIT (OUTPATIENT)
Dept: URGENT CARE | Facility: MEDICAL CENTER | Age: 53
End: 2023-05-01

## 2023-05-01 VITALS
TEMPERATURE: 98 F | DIASTOLIC BLOOD PRESSURE: 93 MMHG | BODY MASS INDEX: 35.16 KG/M2 | SYSTOLIC BLOOD PRESSURE: 157 MMHG | WEIGHT: 224 LBS | RESPIRATION RATE: 18 BRPM | HEART RATE: 65 BPM | HEIGHT: 67 IN | OXYGEN SATURATION: 97 %

## 2023-05-01 DIAGNOSIS — M25.572 ACUTE LEFT ANKLE PAIN: Primary | ICD-10-CM

## 2023-05-01 RX ORDER — NAPROXEN 500 MG/1
500 TABLET ORAL 2 TIMES DAILY WITH MEALS
Qty: 14 TABLET | Refills: 0 | Status: SHIPPED | OUTPATIENT
Start: 2023-05-01 | End: 2023-05-01

## 2023-05-01 RX ORDER — NAPROXEN 500 MG/1
500 TABLET ORAL 2 TIMES DAILY WITH MEALS
Qty: 10 TABLET | Refills: 0 | Status: SHIPPED | OUTPATIENT
Start: 2023-05-01 | End: 2023-05-06

## 2023-05-01 NOTE — PROGRESS NOTES
[facility]        NAME: Sami Drake is a 47 y o  male  : 1970    MRN: [id number]  DATE: May 1, 2023  TIME: 9:55 AM    Assessment and Plan   Acute left ankle pain [M25 572]  1  Acute left ankle pain        - Denies need for brace or crutches   - Ortho referral     Patient Instructions   - Please follow up with orthopedics  - Apply ice to effected area  - Rest and elevate effected area  - Take Naproxen as needed  -Wear brace as needed  Follow up with PCP in 3-5 days  Proceed to  ER if symptoms worsen  Chief Complaint     Chief Complaint   Patient presents with    Ankle Pain     Patient started with left ankle pain yesterday  Patient stated no injury occured and pain came on suddenly  History of Present Illness       48 y/o M present for atraumatic L ankle pain x 2 days  Pt denies any injury, twisting, or increase in activity  Denies swelling, redness, bruise  Has elevated  Pain is a 5/10 and constant  Review of Systems   Review of Systems   Musculoskeletal: Negative for gait problem and joint swelling  Skin: Negative for color change           Current Medications       Current Outpatient Medications:     albuterol (Proventil HFA) 90 mcg/act inhaler, Inhale 2 puffs every 6 (six) hours as needed for wheezing, Disp: 6 7 g, Rfl: 3    escitalopram (LEXAPRO) 10 mg tablet, TAKE 1 TABLET BY MOUTH EVERY DAY, Disp: 90 tablet, Rfl: 3    Current Allergies     Allergies as of 2023 - Reviewed 2023   Allergen Reaction Noted    Sulfa antibiotics Rash 2016            The following portions of the patient's history were reviewed and updated as appropriate: allergies, current medications, past family history, past medical history, past social history, past surgical history and problem list      Past Medical History:   Diagnosis Date    Anxiety     Disease of thyroid gland     Suspected sleep apnea 2022       Past Surgical History:   Procedure Laterality Date    "APPENDECTOMY  2015    CHOLECYSTECTOMY      EAR SURGERY      STOMACH SURGERY         Family History   Problem Relation Age of Onset    Liver disease Mother     Cerebral aneurysm Mother     Alcohol abuse Father     Diabetes Brother     No Known Problems Brother     Substance Abuse Daughter     No Known Problems Maternal Grandmother          Medications have been verified  Objective   /93   Pulse 65   Temp 98 °F (36 7 °C)   Resp 18   Ht 5' 7\" (1 702 m)   Wt 102 kg (224 lb)   SpO2 97%   BMI 35 08 kg/m²   No LMP for male patient  Physical Exam     Physical Exam  Vitals and nursing note reviewed  Constitutional:       General: He is not in acute distress  Appearance: He is not toxic-appearing  HENT:      Head: Normocephalic and atraumatic  Eyes:      Conjunctiva/sclera: Conjunctivae normal    Pulmonary:      Effort: Pulmonary effort is normal    Musculoskeletal:         General: No swelling, tenderness or signs of injury  Comments: PT and DP pulse intact  Non tender to palpation  Active and Passive ROM intact and strong  No swelling   Skin:     General: Skin is warm  Findings: No bruising, erythema, lesion or rash  Neurological:      Mental Status: He is alert     Psychiatric:         Mood and Affect: Mood normal          Behavior: Behavior normal                    "

## 2023-05-17 LAB
DME PARACHUTE DELIVERY DATE ACTUAL: NORMAL
DME PARACHUTE DELIVERY DATE EXPECTED: NORMAL
DME PARACHUTE DELIVERY DATE REQUESTED: NORMAL
DME PARACHUTE DELIVERY NOTE: NORMAL
DME PARACHUTE ITEM DESCRIPTION: NORMAL
DME PARACHUTE ORDER STATUS: NORMAL
DME PARACHUTE SUPPLIER NAME: NORMAL
DME PARACHUTE SUPPLIER PHONE: NORMAL

## 2023-08-01 DIAGNOSIS — F41.0 PANIC DISORDER: ICD-10-CM

## 2023-08-01 RX ORDER — ESCITALOPRAM OXALATE 10 MG/1
TABLET ORAL
Qty: 90 TABLET | Refills: 0 | Status: SHIPPED | OUTPATIENT
Start: 2023-08-01

## 2023-09-06 ENCOUNTER — OFFICE VISIT (OUTPATIENT)
Dept: URGENT CARE | Facility: MEDICAL CENTER | Age: 53
End: 2023-09-06
Payer: COMMERCIAL

## 2023-09-06 VITALS
BODY MASS INDEX: 34.06 KG/M2 | RESPIRATION RATE: 18 BRPM | DIASTOLIC BLOOD PRESSURE: 81 MMHG | HEART RATE: 80 BPM | TEMPERATURE: 99 F | WEIGHT: 217 LBS | HEIGHT: 67 IN | SYSTOLIC BLOOD PRESSURE: 119 MMHG | OXYGEN SATURATION: 96 %

## 2023-09-06 DIAGNOSIS — L03.114 CELLULITIS OF LEFT FOREARM: Primary | ICD-10-CM

## 2023-09-06 PROCEDURE — G0382 LEV 3 HOSP TYPE B ED VISIT: HCPCS | Performed by: PHYSICIAN ASSISTANT

## 2023-09-06 RX ORDER — DOXYCYCLINE HYCLATE 100 MG/1
100 CAPSULE ORAL EVERY 12 HOURS SCHEDULED
Qty: 20 CAPSULE | Refills: 0 | Status: SHIPPED | OUTPATIENT
Start: 2023-09-06 | End: 2023-09-16

## 2023-09-06 NOTE — PROGRESS NOTES
[facility]        NAME: Shazia Mercado is a 47 y.o. male  : 1970    MRN: [id number]  DATE: 2023  TIME: 5:15 PM    Assessment and Plan   Cellulitis of left forearm [L03.114]  1. Cellulitis of left forearm  doxycycline hyclate (VIBRAMYCIN) 100 mg capsule            Patient Instructions     1. Over-the-counter ibuprofen and/or acetaminophen as needed for pain. 2.  Apply warm compresses and elevation to the left forearm area 3-4 times daily for 20 to 30 minutes until symptoms resolve. 3.  Go to the ER immediately for any significantly worsening symptoms. 4.  Return here in 5 days for any unimproving symptoms. Chief Complaint     Chief Complaint   Patient presents with   • Spider Bite     Pt. With a spider bite to his left arm from 2 days ago. That is red, swollen, and painful. History of Present Illness       64-year-old male patient with a 2-day history of worsening redness, tenderness, warmth to the anterior aspect of the left distal forearm and an area where he believes a spider may have bitten him. Patient states he had other similar bites over the last few weeks which have healed. This 1 does not seem to be getting better. Denies any fever or chills. Patient states that he has been trying to express the area but only clear fluid comes out. Review of Systems   Review of Systems   Constitutional: Negative for chills and fever. HENT: Negative for ear pain and sore throat. Eyes: Negative for pain and visual disturbance. Respiratory: Negative for cough and shortness of breath. Cardiovascular: Negative for chest pain and palpitations. Gastrointestinal: Negative for abdominal pain and vomiting. Genitourinary: Negative for dysuria and hematuria. Musculoskeletal: Negative for arthralgias and back pain. Skin: Positive for color change, rash and wound. Neurological: Negative for seizures and syncope.    All other systems reviewed and are negative. Current Medications       Current Outpatient Medications:   •  albuterol (Proventil HFA) 90 mcg/act inhaler, Inhale 2 puffs every 6 (six) hours as needed for wheezing, Disp: 6.7 g, Rfl: 3  •  doxycycline hyclate (VIBRAMYCIN) 100 mg capsule, Take 1 capsule (100 mg total) by mouth every 12 (twelve) hours for 10 days, Disp: 20 capsule, Rfl: 0  •  escitalopram (LEXAPRO) 10 mg tablet, TAKE 1 TABLET BY MOUTH EVERY DAY, Disp: 90 tablet, Rfl: 0  •  naproxen (Naprosyn) 500 mg tablet, Take 1 tablet (500 mg total) by mouth 2 (two) times a day with meals for 5 days, Disp: 10 tablet, Rfl: 0    Current Allergies     Allergies as of 09/06/2023 - Reviewed 09/06/2023   Allergen Reaction Noted   • Sulfa antibiotics Rash 02/20/2016            The following portions of the patient's history were reviewed and updated as appropriate: allergies, current medications, past family history, past medical history, past social history, past surgical history and problem list.     Past Medical History:   Diagnosis Date   • Anxiety    • Disease of thyroid gland    • HL (hearing loss)    • Otitis media    • Sleep apnea    • Sleep difficulties    • Suspected sleep apnea 12/20/2022       Past Surgical History:   Procedure Laterality Date   • APPENDECTOMY  2015   • CHOLECYSTECTOMY     • EAR SURGERY     • STOMACH SURGERY     • TYMPANOPLASTY         Family History   Problem Relation Age of Onset   • Liver disease Mother    • Cerebral aneurysm Mother    • Alcohol abuse Father    • Diabetes Brother    • No Known Problems Brother    • Substance Abuse Daughter    • No Known Problems Maternal Grandmother          Medications have been verified. Objective   /81   Pulse 80   Temp 99 °F (37.2 °C)   Resp 18   Ht 5' 7" (1.702 m)   Wt 98.4 kg (217 lb)   SpO2 96%   BMI 33.99 kg/m²        Physical Exam     Physical Exam  Vitals and nursing note reviewed. Constitutional:       General: He is not in acute distress.      Appearance: Normal appearance. He is not ill-appearing. HENT:      Head: Normocephalic. Nose: Nose normal.      Mouth/Throat:      Mouth: Mucous membranes are moist.      Pharynx: Oropharynx is clear. Eyes:      Conjunctiva/sclera: Conjunctivae normal.      Pupils: Pupils are equal, round, and reactive to light. Cardiovascular:      Rate and Rhythm: Normal rate and regular rhythm. Pulses: Normal pulses. Pulmonary:      Effort: Pulmonary effort is normal.      Breath sounds: Normal breath sounds. Musculoskeletal:         General: Normal range of motion. Cervical back: Normal range of motion and neck supple. Skin:     General: Skin is warm and dry. Capillary Refill: Capillary refill takes less than 2 seconds. Comments: 4 cm x 5 cm area of redness, swelling, induration, warmth, tenderness with a central puncture wound located on the distal aspect of the anterior left forearm. No red streaking. No drainage. Neurological:      Mental Status: He is alert and oriented to person, place, and time.    Psychiatric:         Mood and Affect: Mood normal.         Behavior: Behavior normal.

## 2023-09-06 NOTE — PATIENT INSTRUCTIONS
1.  Over-the-counter ibuprofen and/or acetaminophen as needed for pain. 2.  Apply warm compresses and elevation to the left forearm area 3-4 times daily for 20 to 30 minutes until symptoms resolve. 3.  Go to the ER immediately for any significantly worsening symptoms. 4.  Return here in 5 days for any unimproving symptoms.

## 2023-10-11 ENCOUNTER — OFFICE VISIT (OUTPATIENT)
Dept: URGENT CARE | Facility: MEDICAL CENTER | Age: 53
End: 2023-10-11
Payer: COMMERCIAL

## 2023-10-11 VITALS
OXYGEN SATURATION: 99 % | HEART RATE: 71 BPM | WEIGHT: 218 LBS | BODY MASS INDEX: 34.14 KG/M2 | DIASTOLIC BLOOD PRESSURE: 77 MMHG | TEMPERATURE: 98.7 F | SYSTOLIC BLOOD PRESSURE: 131 MMHG | RESPIRATION RATE: 18 BRPM

## 2023-10-11 DIAGNOSIS — L01.00 IMPETIGO: ICD-10-CM

## 2023-10-11 DIAGNOSIS — B35.6 GROIN RINGWORM: Primary | ICD-10-CM

## 2023-10-11 PROCEDURE — G0382 LEV 3 HOSP TYPE B ED VISIT: HCPCS | Performed by: PHYSICIAN ASSISTANT

## 2023-10-11 RX ORDER — DOXYCYCLINE 100 MG/1
100 TABLET ORAL 2 TIMES DAILY
Qty: 20 TABLET | Refills: 0 | Status: SHIPPED | OUTPATIENT
Start: 2023-10-11 | End: 2023-10-21

## 2023-10-11 RX ORDER — CLOTRIMAZOLE 1 %
CREAM (GRAM) TOPICAL 2 TIMES DAILY
Qty: 28 G | Refills: 0 | Status: SHIPPED | OUTPATIENT
Start: 2023-10-11

## 2023-10-11 NOTE — PROGRESS NOTES
[facility]        NAME: Tomas Quijano is a 47 y.o. male  : 1970    MRN: [id number]  DATE: 2023  TIME: 5:39 PM    /77   Pulse 71   Temp 98.7 °F (37.1 °C)   Resp 18   Wt 98.9 kg (218 lb)   SpO2 99%   BMI 34.14 kg/m²     Assessment and Plan   Groin ringworm [B35.6]  1. Groin ringworm  clotrimazole (LOTRIMIN) 1 % cream    doxycycline (ADOXA) 100 MG tablet      2. Impetigo  clotrimazole (LOTRIMIN) 1 % cream    doxycycline (ADOXA) 100 MG tablet            Patient Instructions       Follow up with PCP in 3-5 days. Proceed to  ER if symptoms worsen. Chief Complaint     Chief Complaint   Patient presents with    Rash     Red and itchy rash on pelvis for one month         History of Present Illness       Pt with itching rash to right groin x 1 month getting worse also with 2 areas on buttocks scabbed         Review of Systems   Review of Systems   Constitutional: Negative. HENT: Negative. Eyes: Negative. Respiratory: Negative. Cardiovascular: Negative. Gastrointestinal: Negative. Endocrine: Negative. Genitourinary: Negative. Musculoskeletal: Negative. Skin:  Positive for rash. Allergic/Immunologic: Negative. Neurological: Negative. Hematological: Negative. Psychiatric/Behavioral: Negative. All other systems reviewed and are negative.         Current Medications       Current Outpatient Medications:     albuterol (Proventil HFA) 90 mcg/act inhaler, Inhale 2 puffs every 6 (six) hours as needed for wheezing, Disp: 6.7 g, Rfl: 3    clotrimazole (LOTRIMIN) 1 % cream, Apply topically 2 (two) times a day, Disp: 28 g, Rfl: 0    doxycycline (ADOXA) 100 MG tablet, Take 1 tablet (100 mg total) by mouth 2 (two) times a day for 10 days, Disp: 20 tablet, Rfl: 0    escitalopram (LEXAPRO) 10 mg tablet, TAKE 1 TABLET BY MOUTH EVERY DAY, Disp: 90 tablet, Rfl: 0    naproxen (Naprosyn) 500 mg tablet, Take 1 tablet (500 mg total) by mouth 2 (two) times a day with meals for 5 days, Disp: 10 tablet, Rfl: 0    Current Allergies     Allergies as of 10/11/2023 - Reviewed 10/11/2023   Allergen Reaction Noted    Sulfa antibiotics Rash 02/20/2016            The following portions of the patient's history were reviewed and updated as appropriate: allergies, current medications, past family history, past medical history, past social history, past surgical history and problem list.     Past Medical History:   Diagnosis Date    Anxiety     Disease of thyroid gland     HL (hearing loss)     Otitis media     Sleep apnea     Sleep difficulties     Suspected sleep apnea 12/20/2022       Past Surgical History:   Procedure Laterality Date    APPENDECTOMY  2015    CHOLECYSTECTOMY      EAR SURGERY      STOMACH SURGERY      TYMPANOPLASTY         Family History   Problem Relation Age of Onset    Liver disease Mother     Cerebral aneurysm Mother     Alcohol abuse Father     Diabetes Brother     No Known Problems Brother     Substance Abuse Daughter     No Known Problems Maternal Grandmother          Medications have been verified. Objective   /77   Pulse 71   Temp 98.7 °F (37.1 °C)   Resp 18   Wt 98.9 kg (218 lb)   SpO2 99%   BMI 34.14 kg/m²        Physical Exam     Physical Exam  Vitals and nursing note reviewed. Constitutional:       Appearance: Normal appearance. He is normal weight. HENT:      Head: Normocephalic. Right Ear: Tympanic membrane, ear canal and external ear normal.      Left Ear: Tympanic membrane, ear canal and external ear normal.      Nose: Nose normal.      Mouth/Throat:      Mouth: Mucous membranes are moist.   Cardiovascular:      Rate and Rhythm: Normal rate and regular rhythm. Pulmonary:      Effort: Pulmonary effort is normal.   Abdominal:      General: Abdomen is flat. Bowel sounds are normal.      Palpations: Abdomen is soft. Musculoskeletal:         General: Normal range of motion.       Cervical back: Normal range of motion and neck supple. Skin:     General: Skin is warm. Capillary Refill: Capillary refill takes less than 2 seconds. Comments: Right groin ringworm       Buttock 2   1 cm scabbed areas, impetigo like  mild tender    Neurological:      General: No focal deficit present. Mental Status: He is alert and oriented to person, place, and time.    Psychiatric:         Mood and Affect: Mood normal.

## 2023-10-27 ENCOUNTER — OFFICE VISIT (OUTPATIENT)
Dept: PAIN MEDICINE | Facility: CLINIC | Age: 53
End: 2023-10-27
Payer: COMMERCIAL

## 2023-10-27 VITALS
BODY MASS INDEX: 33.99 KG/M2 | HEART RATE: 64 BPM | WEIGHT: 217 LBS | DIASTOLIC BLOOD PRESSURE: 90 MMHG | SYSTOLIC BLOOD PRESSURE: 128 MMHG

## 2023-10-27 DIAGNOSIS — M51.16 INTERVERTEBRAL DISC DISORDER WITH RADICULOPATHY OF LUMBAR REGION: Primary | ICD-10-CM

## 2023-10-27 DIAGNOSIS — M47.816 LUMBAR SPONDYLOSIS: ICD-10-CM

## 2023-10-27 PROCEDURE — 99204 OFFICE O/P NEW MOD 45 MIN: CPT | Performed by: ANESTHESIOLOGY

## 2023-10-27 RX ORDER — MELOXICAM 15 MG/1
15 TABLET ORAL DAILY
Qty: 30 TABLET | Refills: 1 | Status: SHIPPED | OUTPATIENT
Start: 2023-10-27

## 2023-10-27 NOTE — PROGRESS NOTES
Assessment  1. Intervertebral disc disorder with radiculopathy of lumbar region    2. Lumbar spondylosis        Plan  The patient's symptoms, history/physical are consistent with pain that is multifactorial in origin but consistent with underlying lumbar disc degeneration which is leading to radicular symptoms as well as underlying lumbar spondylosis. He would benefit from skilled physical therapy and has been given a referral for that. He will follow back up in 6 to 8 weeks and if he is still symptomatic, we will get an MRI of the lumbar spine. For now, I will place him on meloxicam 15 mg daily to help with stiffness and arthritic pain he. He was advised to take with a meal to decrease stomach irritation. My impressions and treatment recommendations were discussed in detail with the patient who verbalized understanding and had no further questions. Discharge instructions were provided. I personally saw and examined the patient and I agree with the above discussed plan of care. Orders Placed This Encounter   Procedures   • Ambulatory referral to Physical Therapy     Standing Status:   Future     Standing Expiration Date:   10/27/2024     Referral Priority:   Routine     Referral Type:   Physical Therapy     Referral Reason:   Specialty Services Required     Requested Specialty:   Physical Therapy     Number of Visits Requested:   1     Expiration Date:   10/27/2024     New Medications Ordered This Visit   Medications   • meloxicam (MOBIC) 15 mg tablet     Sig: Take 1 tablet (15 mg total) by mouth daily     Dispense:  30 tablet     Refill:  1       History of Present Illness    Shila Hernandez is a 47 y.o. male who presents for consultation regards to chronic lower back pain has been present for more than 20 years. Symptoms are moderate to severe rated 7-8/10 on a numeric rating scale that is felt constantly. Symptoms are aggravated lying down, standing, bending.   Pain is described to be dull/aching and throbbing in the lower back and can radiate into the hips and thighs. Treatment history has included use of a TENS unit and heat/ice which are moderately helpful. I have personally reviewed and/or updated the patient's past medical history, past surgical history, family history, social history, current medications, allergies, and vital signs today. Review of Systems   Constitutional:  Positive for unexpected weight change. Negative for fever. HENT:  Positive for hearing loss. Negative for trouble swallowing. Eyes:  Negative for visual disturbance. Respiratory:  Negative for shortness of breath and wheezing. Cardiovascular:  Negative for chest pain and palpitations. Gastrointestinal:  Negative for constipation, diarrhea, nausea and vomiting. Endocrine: Negative for cold intolerance, heat intolerance and polydipsia. Genitourinary:  Negative for difficulty urinating and frequency. Musculoskeletal:  Positive for back pain, gait problem and joint swelling. Negative for arthralgias and myalgias. Skin:  Negative for rash. Neurological:  Negative for dizziness, seizures, syncope, weakness and headaches. Hematological:  Does not bruise/bleed easily. Psychiatric/Behavioral:  Negative for dysphoric mood. The patient is nervous/anxious. All other systems reviewed and are negative.       Patient Active Problem List   Diagnosis   • Panic disorder   • TSH elevation   • Anxiety   • Sacroiliac pain   • Obesity (BMI 35.0-39.9 without comorbidity)   • RAUL (obstructive sleep apnea)       Past Medical History:   Diagnosis Date   • Anxiety    • Disease of thyroid gland    • HL (hearing loss)    • Otitis media    • Sleep apnea    • Sleep difficulties    • Suspected sleep apnea 12/20/2022       Past Surgical History:   Procedure Laterality Date   • APPENDECTOMY  2015   • CHOLECYSTECTOMY     • EAR SURGERY     • STOMACH SURGERY     • TYMPANOPLASTY         Family History   Problem Relation Age of Onset   • Liver disease Mother    • Cerebral aneurysm Mother    • Alcohol abuse Father    • Diabetes Brother    • No Known Problems Brother    • Substance Abuse Daughter    • No Known Problems Maternal Grandmother        Social History     Occupational History   • Not on file   Tobacco Use   • Smoking status: Never   • Smokeless tobacco: Never   Vaping Use   • Vaping Use: Never used   Substance and Sexual Activity   • Alcohol use: No   • Drug use: No   • Sexual activity: Not on file       Current Outpatient Medications on File Prior to Visit   Medication Sig   • albuterol (Proventil HFA) 90 mcg/act inhaler Inhale 2 puffs every 6 (six) hours as needed for wheezing   • clotrimazole (LOTRIMIN) 1 % cream Apply topically 2 (two) times a day   • escitalopram (LEXAPRO) 10 mg tablet TAKE 1 TABLET BY MOUTH EVERY DAY   • [DISCONTINUED] naproxen (Naprosyn) 500 mg tablet Take 1 tablet (500 mg total) by mouth 2 (two) times a day with meals for 5 days     No current facility-administered medications on file prior to visit. Allergies   Allergen Reactions   • Sulfa Antibiotics Rash       Physical Exam    /90   Pulse 64   Wt 98.4 kg (217 lb)   BMI 33.99 kg/m²     Constitutional: normal, well developed, well nourished, alert, in no distress and non-toxic and no overt pain behavior. and obese  Eyes: anicteric  HEENT: grossly intact  Neck: supple, symmetric, trachea midline and no masses   Pulmonary:even and unlabored  Cardiovascular:No edema or pitting edema present  Skin:Normal without rashes or lesions and well hydrated  Psychiatric:Mood and affect appropriate  Neurologic:Cranial Nerves II-XII grossly intact  Musculoskeletal:normal    Lumbar Spine Exam  Appearance:  Normal lordosis  Palpation/Tenderness:  left lumbar paraspinal tenderness  right lumbar paraspinal tenderness  Range of Motion:  Flexion:   Moderately limited  with pain  Extension:  Moderately limited  with pain  Motor Strength:  Left hip flexion:  5/5  Left hip extension:  5/5  Right hip flexion:  5/5  Right hip extension:  5/5  Left knee flexion:  5/5  Left knee extension:  5/5  Right knee flexion:  5/5  Right knee extension:  5/5  Left foot dorsiflexion:  5/5  Left foot plantar flexion:  5/5  Right foot dorsiflexion:  5/5  Right foot plantar flexion:  5/5  Reflexes:  Left Patellar:  1+   Right Patellar:  1+   Left Achilles:  1+   Right Achilles:  1+     Imaging    XR LUMBAR SPINE(11/5/2020)     INDICATION:   M54.5: Low back pain. COMPARISON:  None     VIEWS:  XR SPINE LUMBAR 2 OR 3 VIEWS INJURY        FINDINGS:     There are 5 non rib bearing lumbar vertebral bodies. There is no evidence of acute fracture or destructive osseous lesion. Alignment is unremarkable. L1-L2 through L5-S1 small osteophytes. L4-L5, posterior L5-S1 disc space narrowing. The pedicles appear intact.      Left renal lower pole 3 mm calculus

## 2023-10-27 NOTE — PATIENT INSTRUCTIONS
Core Strengthening Exercises   AMBULATORY CARE:   What you need to know about core strengthening exercises: Your core includes the muscles of your lower back, hip, pelvis, and abdomen. Core strengthening exercises help heal and strengthen these muscles. This helps prevent another injury, and keeps your pelvis, spine, and hips in the correct position. Call your doctor or physical therapist if:   You have sharp or worsening pain during exercise or at rest.    You have questions or concerns about your shoulder exercises. Safety tips:  Talk to your healthcare provider before you start an exercise program. A physical therapist can teach you how to do core strengthening exercises safely. Do the exercises on a mat or firm surface. A firm surface will support your spine and prevent low back pain. Do not do these exercises on a bed. Move slowly and smoothly. Avoid fast or jerky motions. Stop if you feel pain. You may feel some discomfort at first, but you should not feel pain. Tell your provider or physical therapist if you have pain while you exercise. Regular exercise will help decrease your discomfort over time. Breathe normally during core exercises. Do not hold your breath. This may cause an increase in blood pressure and prevent muscle strengthening. Your healthcare provider will tell you when to inhale and exhale during the exercise. Begin all of your exercises with abdominal bracing. Abdominal bracing helps warm up your core muscles. You can also practice abdominal bracing throughout the day. Lie on your back with your knees bent and feet flat on the floor. Place your arms in a relaxed position beside your body. Tighten your abdominal muscles. Pull your belly button in and up toward your spine. Hold for 5 seconds. Relax your muscles. Repeat 10 times. Core strengthening exercises: Your healthcare provider will tell you how often to do these exercises.  The provider will also tell you how many repetitions of each exercise you should do. Hold each exercise for 5 seconds or as directed. As you get stronger, increase your hold to 10 to 15 seconds. You can do some of these exercises on a stability ball, or with a weight. Ask your healthcare provider how to use a stability ball or weight for these exercises:  Bridging:  Lie on your back with your knees bent and feet flat on the floor. Rest your arms at your side. Tighten your buttocks, and then lift your hips 1 inch off the floor. Hold for 5 seconds. When you can do this exercise without pain for 10 seconds, increase the distance you lift your hips. A good goal is to be able to lift your hips so that your shoulders, hips, and knees are in a straight line. Dead bug:  Lie on your back with your knees bent and feet flat on the floor. Place your arms in a relaxed position beside your body. Begin with abdominal bracing. Next, raise one leg, keeping your knee bent. Hold for 5 seconds. Repeat with the other leg. When you can do this exercise without pain for 10 to 15 seconds, you may raise one straight leg and hold. Repeat with the other leg. Quadruped:  Place your hands and knees on the floor. Keep your wrists directly below your shoulders and your knees directly below your hips. Pull your belly button in toward your spine. Do not flatten or arch your back. Tighten your abdominal muscles below your belly button. Hold for 5 seconds. When you can do this exercise without pain for 10 to 15 seconds, you may extend one arm and hold. Repeat on the other side. Side bridge exercises:      Standing side bridge:  Stand next to a wall and extend one arm toward the wall. Place your palm flat on the wall with your fingers pointing upward. Begin with abdominal bracing. Next, without moving your feet, slowly bend your arm to 90 degrees. Hold for 5 seconds. Repeat on the other side.  When you can do this exercise without pain for 10 to 15 seconds, you may do the bent leg side bridge on the floor. Bent leg side bridge:  Lie on one side with your legs, hips, and shoulders in a straight line. Prop yourself up onto your forearm so your elbow is directly below your shoulder. Bend your knees back to 90 degrees. Begin with abdominal bracing. Next, lift your hips and balance yourself on your forearm and knees. Hold for 5 seconds. Repeat on the other side. When you can do this exercise without pain for 10 to 15 seconds, you may do the straight leg side bridge on the floor. Straight leg side bridge:  Lie on one side with your legs, hips, and shoulders in a straight line. Prop yourself up onto your forearm so your elbow is directly below your shoulder. Begin with abdominal bracing. Lift your hips off the floor and balance yourself on your forearm and the outside of your flexed foot. Do not let your ankle bend sideways. Hold for 5 seconds. Repeat on the other side. When you can do this exercise without pain for 10 to 15 seconds, ask your healthcare provider for more advanced exercises. Superman:  Lie on your stomach. Extend your arms forward on the floor. Tighten your abdominal muscles and lift your right hand and left leg off the floor. Hold this position. Slowly return to the starting position. Tighten your abdominal muscles and lift your left hand and right leg off the floor. Hold this position. Slowly return to the starting position. Clam:  Lie on your side with your knees bent. Put your bottom arm under your head to keep your neck in line. Put your top hand on your hip to keep your pelvis from moving. Put your heels together, and keep them together during this exercise. Slowly raise your top knee toward the ceiling. Then lower your leg so your knees are together. Repeat this exercise 10 times. Then switch sides and do the exercise 10 times with the other leg.          Curl up:  Lie on your back with your knees bent and feet flat on the floor. Place your hands, palms down, underneath your lower back. Next, with your elbows on the floor, lift your shoulders and chest 2 to 3 inches off the floor. Keep your head in line with your shoulders. Hold this position. Slowly return to the starting position. Straight leg raises:  Lie on your back with one leg straight. Bend the other knee and place your foot flat on the floor. Tighten your abdominal muscles. Keep your leg straight and slowly lift it straight up 6 to 12 inches off the floor. Hold this position. Lower your leg slowly. Do as many repetitions as directed on this side. Repeat with the other leg. Plank:  Lie on your stomach. Bend your elbows and place your forearms flat on the floor. Lift your chest, stomach, and knees off the floor. Make sure your elbows are below your shoulders. Your body should be in a straight line. Do not let your hips or lower back sink to the ground. Squeeze your abdominal muscles together and hold for 15 seconds. To make this exercise harder, hold for 30 seconds or lift 1 leg at a time. Bicycles:  Lie on your back. Bend both knees and bring them toward your chest. Your calves should be parallel to the floor. Place the palms of your hands on the back of your head. Straighten your right leg and keep it lifted 2 inches off the floor. Raise your head and shoulders off the floor and twist towards your left. Keep your head and shoulders lifted. Bend your right knee while you straighten your left leg. Keep your left leg 2 inches off the floor. Twist your head and chest towards the left leg. Continue to straighten 1 leg at a time and twist.       Follow up with your doctor or physical therapist as directed:  Write down your questions so you remember to ask them during your visits. © Copyright [employer] Matson 2023 Information is for End User's use only and may not be sold, redistributed or otherwise used for commercial purposes.   The above information is an  only. It is not intended as medical advice for individual conditions or treatments. Talk to your doctor, nurse or pharmacist before following any medical regimen to see if it is safe and effective for you.

## 2023-10-29 ENCOUNTER — HOSPITAL ENCOUNTER (EMERGENCY)
Facility: HOSPITAL | Age: 53
Discharge: HOME | End: 2023-10-29
Attending: EMERGENCY MEDICINE
Payer: COMMERCIAL

## 2023-10-29 VITALS
WEIGHT: 217 LBS | DIASTOLIC BLOOD PRESSURE: 86 MMHG | BODY MASS INDEX: 34.06 KG/M2 | HEIGHT: 67 IN | TEMPERATURE: 97.8 F | HEART RATE: 72 BPM | SYSTOLIC BLOOD PRESSURE: 147 MMHG | RESPIRATION RATE: 20 BRPM | OXYGEN SATURATION: 99 %

## 2023-10-29 DIAGNOSIS — F41.9 ANXIETY: Primary | ICD-10-CM

## 2023-10-29 PROCEDURE — 99283 EMERGENCY DEPT VISIT LOW MDM: CPT

## 2023-10-29 PROCEDURE — 99284 EMERGENCY DEPT VISIT MOD MDM: CPT | Performed by: EMERGENCY MEDICINE

## 2023-10-29 NOTE — ED NOTES
Crisis met with pt who presents in ED for anxiety. Pt stated he got divorce a few years ago and recently began dating. He stated he has been seeing a therapist for the past 4 weeks and would like for therapy sessions to go beyond 12 weeks. Pt states he enjoys his therapy sessions. Pt states his insurance will only approve 12 weeks. He denies suicidal, homicidal ideations, auditory, visual hallucinations or thoughts to self harm. Crisis explained to patient that once he has reach week 11 and his therapist recommends continued session, his therapist will request an extension with his insurance for continuation. Pt stated he did not want a new therapist but wanted to see if therapy would continue beyond 12 weeks.

## 2023-10-29 NOTE — DISCHARGE INSTRUCTIONS
As discussed, your insurance will pay for 12 weeks of therapy sessions. Recommend you reach out to your therapist at week 11 to request an extension with your insurance for continuation of therapy. Your therapist would need to justify why you need insurance beyond 12 weeks in order for it to be covered by insurance. Return to ED if any worsening symptoms, suicidal thoughts, homicidal thoughts or plans.

## 2023-10-29 NOTE — ED ATTENDING ATTESTATION
10/29/2023  I, Chago Davila MD, saw and evaluated the patient. I have discussed the patient with the resident/non-physician practitioner and agree with the resident's/non-physician practitioner's findings, Plan of Care, and MDM as documented in the resident's/non-physician practitioner's note, except where noted. All available labs and Radiology studies were reviewed. I was present for key portions of any procedure(s) performed by the resident/non-physician practitioner and I was immediately available to provide assistance. At this point I agree with the current assessment done in the Emergency Department. I have conducted an independent evaluation of this patient a history and physical is as follows:    81-year-old male with history of anxiety presenting for evaluation after having a particularly bad day. States he is having relationship difficulties. He is  and trying to date but having some issues which he did not completely get into. States he recently started going to counseling and was told he only has a certain number of visits insurance will cover but he is unsure how many doses as he has not consulted with the insurance company yet. States he is just concerned about what will happen when his counseling visits run out. He has been taking Lexapro for years prescribed by his family physician. Denies any suicidal thoughts or any thoughts of harm to others. Seems appropriate here. Affect is normal.  Judgment appears intact. ED Course     Patient seen by crisis. Note that he has 12 visits with therapist initially approved by insurance. At the end of that therapist will need to request additional time through insurance. Patient well at this time. Stable for discharge home.     Critical Care Time  Procedures

## 2023-10-29 NOTE — ED PROVIDER NOTES
History  Chief Complaint   Patient presents with    Anxiety     Patient states that he got  a few years ago and wife recently began dating. Started counseling recently and it has been going well but yesterday feels like he is having a hard time handling his emotions and came in because he feels the need to talk to someone. 28-year-old male with anxiety on Lexapro for the past few years presenting to the ED for evaluation of increasing anxiety. Patient reports a few years ago, he went through a nasty divorce. Just recently started dating however has been having issues trusting people as he was cheated on in his last relationship. Reports he has been going to counseling once a week for the past 4 weeks, is looking for additional resources as his insurance only covers so many counseling sessions. Goes once a week. Patient's main goal of today's ED visit is for additional resources for counseling after his insurance is done covering the sessions. Lives at home with his daughter. Remote history of suicide attempt in his 25s after fight with his girlfriend via hanging. No SI and/or HI or plans. Denies recreational drug use. No alcohol use. No hallucinations. Prior to Admission Medications   Prescriptions Last Dose Informant Patient Reported? Taking?    albuterol (Proventil HFA) 90 mcg/act inhaler   No Yes   Sig: Inhale 2 puffs every 6 (six) hours as needed for wheezing   clotrimazole (LOTRIMIN) 1 % cream 10/29/2023  No Yes   Sig: Apply topically 2 (two) times a day   escitalopram (LEXAPRO) 10 mg tablet 10/29/2023  No Yes   Sig: TAKE 1 TABLET BY MOUTH EVERY DAY   meloxicam (MOBIC) 15 mg tablet 10/29/2023  No Yes   Sig: Take 1 tablet (15 mg total) by mouth daily      Facility-Administered Medications: None       Past Medical History:   Diagnosis Date    Anxiety     Disease of thyroid gland     HL (hearing loss)     Otitis media     Sleep apnea     Sleep difficulties     Suspected sleep apnea 12/20/2022       Past Surgical History:   Procedure Laterality Date    APPENDECTOMY  2015    CHOLECYSTECTOMY      EAR SURGERY      STOMACH SURGERY      TYMPANOPLASTY         Family History   Problem Relation Age of Onset    Liver disease Mother     Cerebral aneurysm Mother     Alcohol abuse Father     Diabetes Brother     No Known Problems Brother     Substance Abuse Daughter     No Known Problems Maternal Grandmother      I have reviewed and agree with the history as documented. E-Cigarette/Vaping    E-Cigarette Use Never User      E-Cigarette/Vaping Substances    Nicotine No     THC No     CBD No     Flavoring No     Other No     Unknown No      Social History     Tobacco Use    Smoking status: Never    Smokeless tobacco: Never   Vaping Use    Vaping Use: Never used   Substance Use Topics    Alcohol use: No    Drug use: No        Review of Systems   Constitutional:  Negative for chills and fever. HENT:  Negative for ear pain and sore throat. Eyes:  Negative for pain and visual disturbance. Respiratory:  Negative for cough and shortness of breath. Cardiovascular:  Negative for chest pain and palpitations. Gastrointestinal:  Negative for abdominal pain and vomiting. Genitourinary:  Negative for dysuria and hematuria. Musculoskeletal:  Negative for arthralgias and back pain. Skin:  Negative for color change and rash. Neurological:  Negative for seizures and syncope. Psychiatric/Behavioral:  Negative for suicidal ideas. The patient is nervous/anxious. All other systems reviewed and are negative.       Physical Exam  ED Triage Vitals   Temperature Pulse Respirations Blood Pressure SpO2   10/29/23 1758 10/29/23 1757 10/29/23 1757 10/29/23 1757 10/29/23 1757   97.8 °F (36.6 °C) 72 20 147/86 99 %      Temp Source Heart Rate Source Patient Position - Orthostatic VS BP Location FiO2 (%)   10/29/23 1758 10/29/23 1757 10/29/23 1757 10/29/23 1757 --   Oral Monitor Sitting Right arm       Pain Score --                    Orthostatic Vital Signs  Vitals:    10/29/23 1757   BP: 147/86   Pulse: 72   Patient Position - Orthostatic VS: Sitting       Physical Exam  Vitals and nursing note reviewed. Constitutional:       General: He is not in acute distress. Appearance: He is well-developed. HENT:      Head: Normocephalic and atraumatic. Eyes:      Conjunctiva/sclera: Conjunctivae normal.   Cardiovascular:      Rate and Rhythm: Normal rate and regular rhythm. Pulses:           Radial pulses are 2+ on the right side and 2+ on the left side. Heart sounds: Normal heart sounds, S1 normal and S2 normal. No murmur heard. Pulmonary:      Effort: Pulmonary effort is normal. No respiratory distress. Breath sounds: Normal breath sounds. Abdominal:      Palpations: Abdomen is soft. Tenderness: There is no abdominal tenderness. Musculoskeletal:         General: No swelling. Cervical back: Neck supple. Right lower leg: No edema. Left lower leg: No edema. Skin:     General: Skin is warm and dry. Capillary Refill: Capillary refill takes less than 2 seconds. Neurological:      Mental Status: He is alert and oriented to person, place, and time. GCS: GCS eye subscore is 4. GCS verbal subscore is 5. GCS motor subscore is 6. Psychiatric:         Attention and Perception: Attention and perception normal.         Mood and Affect: Mood and affect normal.         Speech: Speech normal.         Behavior: Behavior normal. Behavior is cooperative. Thought Content: Thought content normal. Thought content is not paranoid or delusional. Thought content does not include homicidal or suicidal ideation. Thought content does not include homicidal or suicidal plan.          Cognition and Memory: Cognition and memory normal.         Judgment: Judgment normal.         ED Medications  Medications - No data to display    Diagnostic Studies  Results Reviewed       None No orders to display         Procedures  Procedures      ED Course  ED Course as of 10/29/23 2322   Pushpa Livingston Oct 29, 2023   1832 Spoke with erin/Johanna Cagle; they will see patient and give him resources   1246 Erin saw and evaluated patient. His insurance will pay for 12 weeks of therapy sessions. Patient does want to continue with therapy beyond 12 weeks and wanted a referral to do so. Patient's current therapist would need to justify why he needs insurance beyond 12 weeks in order for therapy sessions to continue. Erin told patient once he reaches week 11 with therapy, his therapist will request an extension with his insurance for continuation. Patient does not want new therapist at this time. Just wanted to see if his therapy would continue beyond 12 weeks. SBIRT 20yo+      Flowsheet Row Most Recent Value   Initial Alcohol Screen: US AUDIT-C     1. How often do you have a drink containing alcohol? 0 Filed at: 10/29/2023 1800   2. How many drinks containing alcohol do you have on a typical day you are drinking? 0 Filed at: 10/29/2023 1800   3a. Male UNDER 65: How often do you have five or more drinks on one occasion? 0 Filed at: 10/29/2023 1800   3b. FEMALE Any Age, or MALE 65+: How often do you have 4 or more drinks on one occassion? 0 Filed at: 10/29/2023 1800   Audit-C Score 0 Filed at: 10/29/2023 1800   LUIS EDUARDO: How many times in the past year have you. .. Used an illegal drug or used a prescription medication for non-medical reasons? Never Filed at: 10/29/2023 1800                  Medical Decision Making  43-year-old male with anxiety on Lexapro presenting to ED for additional counseling resources. States his insurance will only cover 12 weeks of therapy, wanted plan for after the 12 weeks. No SI and/or HI and/or plans. No hallucinations. Very well-appearing patient. Case discussed with erin who saw and evaluated patient at bedside.   See ED course for discussion. Patient does not want any additional resources at this time. Discharge home outpatient follow-up, strict return precautions. Disposition  Final diagnoses:   Anxiety     Time reflects when diagnosis was documented in both MDM as applicable and the Disposition within this note       Time User Action Codes Description Comment    10/29/2023  6:55 PM Mary Guerra Add [F41.9] Anxiety           ED Disposition       ED Disposition   Discharge    Condition   Stable    Date/Time   Sun Oct 29, 2023 1855    [address] discharge to home/self care. Follow-up Information       Follow up With Specialties Details Why Contact Info    Carmelo Mckee MD Family Medicine Schedule an appointment as soon as possible for a visit today For follow-up [address]  [phone]              Discharge Medication List as of 10/29/2023  7:26 PM        CONTINUE these medications which have NOT CHANGED    Details   albuterol (Proventil HFA) 90 mcg/act inhaler Inhale 2 puffs every 6 (six) hours as needed for wheezing, Starting Thu 12/8/2022, Normal      clotrimazole (LOTRIMIN) 1 % cream Apply topically 2 (two) times a day, Starting Wed 10/11/2023, Normal      escitalopram (LEXAPRO) 10 mg tablet TAKE 1 TABLET BY MOUTH EVERY DAY, Normal      meloxicam (MOBIC) 15 mg tablet Take 1 tablet (15 mg total) by mouth daily, Starting Fri 10/27/2023, Normal           No discharge procedures on file. PDMP Review       None             ED Provider  Attending physically available and evaluated Svetlana Pérez. I managed the patient along with the ED Attending.     Electronically Signed by           Julio Valdovinos MD  10/29/23 3647

## 2023-10-29 NOTE — Clinical Note
Lawanda Werner was seen and treated in our emergency department on 10/29/2023. Diagnosis:     Tuan Little  may return to work on return date. He may return on this date: 10/31/2023    Can return sooner if feeling okay     If you have any questions or concerns, please don't hesitate to call.       Jossie Bell MD    ______________________________           _______________          _______________  Hospital Representative                              Date                                Time
- - -

## 2023-11-01 ENCOUNTER — OFFICE VISIT (OUTPATIENT)
Dept: FAMILY MEDICINE CLINIC | Facility: MEDICAL CENTER | Age: 53
End: 2023-11-01
Payer: COMMERCIAL

## 2023-11-01 VITALS
SYSTOLIC BLOOD PRESSURE: 136 MMHG | WEIGHT: 216 LBS | DIASTOLIC BLOOD PRESSURE: 86 MMHG | OXYGEN SATURATION: 98 % | HEIGHT: 67 IN | HEART RATE: 90 BPM | TEMPERATURE: 97.6 F | BODY MASS INDEX: 33.9 KG/M2

## 2023-11-01 DIAGNOSIS — F41.0 PANIC DISORDER: Primary | ICD-10-CM

## 2023-11-01 PROCEDURE — 99213 OFFICE O/P EST LOW 20 MIN: CPT | Performed by: FAMILY MEDICINE

## 2023-11-01 RX ORDER — ESCITALOPRAM OXALATE 20 MG/1
20 TABLET ORAL DAILY
Qty: 30 TABLET | Refills: 5 | Status: SHIPPED | OUTPATIENT
Start: 2023-11-01 | End: 2024-04-29

## 2023-11-01 NOTE — ASSESSMENT & PLAN NOTE
This patient is taking Lexapro 10 mg. He has been getting therapy with a counselor. Over the weekend he presented to the ER with anxiety, panic. They are going to extend therapy. The patient tells me that the therapist suggested increasing Lexapro. She said 10 mg is a low dose. I disagree however increasing the dose to 20 mg is reasonable. We will do that and have him back in a month and we will recheck him at that time.

## 2023-11-01 NOTE — ASSESSMENT & PLAN NOTE
>>ASSESSMENT AND PLAN FOR ANXIETY WRITTEN ON 11/1/2023  5:51 PM BY LUIS ANTONIO TOLENTINO MD    This patient is taking Lexapro 10 mg. He has been getting therapy with a counselor. Over the weekend he presented to the ER with anxiety, panic. They are going to extend therapy. The patient tells me that the therapist suggested increasing Lexapro. She said 10 mg is a low dose. I disagree however increasing the dose to 20 mg is reasonable. We will do that and have him back in a month and we will recheck him at that time.

## 2023-11-06 ENCOUNTER — EVALUATION (OUTPATIENT)
Dept: PHYSICAL THERAPY | Facility: MEDICAL CENTER | Age: 53
End: 2023-11-06
Payer: COMMERCIAL

## 2023-11-06 DIAGNOSIS — M47.816 LUMBAR SPONDYLOSIS: ICD-10-CM

## 2023-11-06 DIAGNOSIS — M51.16 INTERVERTEBRAL DISC DISORDER WITH RADICULOPATHY OF LUMBAR REGION: Primary | ICD-10-CM

## 2023-11-06 PROCEDURE — 97112 NEUROMUSCULAR REEDUCATION: CPT

## 2023-11-06 PROCEDURE — 97162 PT EVAL MOD COMPLEX 30 MIN: CPT
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
## 2023-11-13 ENCOUNTER — APPOINTMENT (OUTPATIENT)
Dept: PHYSICAL THERAPY | Facility: MEDICAL CENTER | Age: 53
End: 2023-11-13
Payer: COMMERCIAL

## 2023-11-14 ENCOUNTER — OFFICE VISIT (OUTPATIENT)
Dept: PHYSICAL THERAPY | Facility: MEDICAL CENTER | Age: 53
End: 2023-11-14
Payer: COMMERCIAL

## 2023-11-14 DIAGNOSIS — M51.16 INTERVERTEBRAL DISC DISORDER WITH RADICULOPATHY OF LUMBAR REGION: Primary | ICD-10-CM

## 2023-11-14 DIAGNOSIS — M47.816 LUMBAR SPONDYLOSIS: ICD-10-CM

## 2023-11-14 PROCEDURE — 97112 NEUROMUSCULAR REEDUCATION: CPT

## 2023-11-14 PROCEDURE — 97110 THERAPEUTIC EXERCISES: CPT

## 2023-11-14 NOTE — PROGRESS NOTES
Daily Note     Today's date: 2023  Patient name: Svetlana Pérez  : 1970  MRN: [id number]  Referring provider: Mame Jessica MD  Dx:   Encounter Diagnosis     ICD-10-CM    1. Intervertebral disc disorder with radiculopathy of lumbar region  M51.16       2. Lumbar spondylosis  M47.816           Start Time: 1750  Stop Time: 1830  Total time in clinic (min): 40 minutes    Subjective: "I was doing well but then I was in a lot of pain at work today."      Objective: See treatment diary below      Assessment: Tolerated treatment well. Patient reported increased low back sx following repeated extension at the beginning of the session today, so repeated motions were withheld. He responded well to exercises to address L/s and LE mobility, as well as manual mobilizations to promote spinal mobility. He reported feeling better at the end of the session. Patient would benefit from continued PT      Plan: Continue per plan of care. Precautions:   Past Medical History:   Diagnosis Date    Anxiety     Disease of thyroid gland     HL (hearing loss)     Otitis media     Sleep apnea     Sleep difficulties     Suspected sleep apnea 2022     Allergies   Allergen Reactions    Sulfa Antibiotics Rash       Access Code: [email or web address]  URL: [email or web address]  Date: 2023  Prepared by: Robert Barahona    Exercises  - Prone Press Up  - 5-6 x daily - 7 x weekly - 1 sets - 10 reps  - Standing Lumbar Extension with Counter  - 5-6 x daily - 7 x weekly - 1 sets - 10 reps  - Standing Lumbar Extension at [address] [place]  - 5-6 x daily - 7 x weekly - 1 sets - 10 reps    Manuals            L/s mobs  R L3-L5 UPA gr 4 10'                                                  Neuro Re-Ed             Supine PPT             LTR  20x B 3"            Pallof press             Bird dog             Dead bug             REIL  P!           RFIS  P!            DKTC  10x 10"           Pt education  10'           HEP review 25'            Ther Ex             Bridging             3 way hip             Standing rot             STS             DL             Bike             Lat stepping                          Ther Activity                                       Gait Training                                       Modalities             Heat  10'

## 2023-11-20 ENCOUNTER — APPOINTMENT (OUTPATIENT)
Dept: PHYSICAL THERAPY | Facility: MEDICAL CENTER | Age: 53
End: 2023-11-20
Payer: COMMERCIAL

## 2023-11-20 NOTE — PROGRESS NOTES
Daily Note     Today's date: 2023  Patient name: Yasmine Car  : 1970  MRN: [id number]  Referring provider: Susan Appiah MD  Dx:   No diagnosis found. Subjective: "I was doing well but then I was in a lot of pain at work today."      Objective: See treatment diary below      Assessment: Tolerated treatment well. Patient reported increased low back sx following repeated extension at the beginning of the session today, so repeated motions were withheld. He responded well to exercises to address L/s and LE mobility, as well as manual mobilizations to promote spinal mobility. He reported feeling better at the end of the session. Patient would benefit from continued PT      Plan: Continue per plan of care. Precautions:   Past Medical History:   Diagnosis Date    Anxiety     Disease of thyroid gland     HL (hearing loss)     Otitis media     Sleep apnea     Sleep difficulties     Suspected sleep apnea 2022     Allergies   Allergen Reactions    Sulfa Antibiotics Rash       Access Code: [email or web address]  URL: [email or web address]  Date: 2023  Prepared by: Nguyen Justice    Exercises  - Prone Press Up  - 5-6 x daily - 7 x weekly - 1 sets - 10 reps  - Standing Lumbar Extension with Counter  - 5-6 x daily - 7 x weekly - 1 sets - 10 reps  - Standing Lumbar Extension at [address] [place]  - 5-6 x daily - 7 x weekly - 1 sets - 10 reps    Manuals           L/s mobs  R L3-L5 UPA gr 4 10'                                                  Neuro Re-Ed             Supine PPT             LTR  20x B 3"            Pallof press             Bird dog             Dead bug             REIL  P!           RFIS  P!            DKTC  10x 10"           Pt education  10'           HEP review 22'            Ther Ex             Bridging             3 way hip             Standing rot             STS             DL             Bike             Lat stepping                          Ther Activity                                       Gait Training                                       Modalities             Heat  10'

## 2023-11-22 ENCOUNTER — OFFICE VISIT (OUTPATIENT)
Dept: URGENT CARE | Facility: MEDICAL CENTER | Age: 53
End: 2023-11-22
Payer: COMMERCIAL

## 2023-11-22 VITALS
HEIGHT: 67 IN | OXYGEN SATURATION: 99 % | HEART RATE: 80 BPM | BODY MASS INDEX: 33.9 KG/M2 | SYSTOLIC BLOOD PRESSURE: 130 MMHG | TEMPERATURE: 98.9 F | WEIGHT: 216 LBS | RESPIRATION RATE: 18 BRPM | DIASTOLIC BLOOD PRESSURE: 80 MMHG

## 2023-11-22 DIAGNOSIS — L02.213 CUTANEOUS ABSCESS OF CHEST WALL: Primary | ICD-10-CM

## 2023-11-22 PROBLEM — E03.8 SUBCLINICAL HYPOTHYROIDISM: Status: ACTIVE | Noted: 2023-11-22

## 2023-11-22 PROBLEM — N63.20 LEFT BREAST LUMP: Status: ACTIVE | Noted: 2023-11-22

## 2023-11-22 PROCEDURE — G0382 LEV 3 HOSP TYPE B ED VISIT: HCPCS

## 2023-11-22 RX ORDER — DOXYCYCLINE HYCLATE 100 MG/1
100 CAPSULE ORAL EVERY 12 HOURS SCHEDULED
Qty: 10 CAPSULE | Refills: 0 | Status: SHIPPED | OUTPATIENT
Start: 2023-11-22 | End: 2023-11-27

## 2023-11-22 NOTE — PROGRESS NOTES
[facility]        NAME: Shila Hernandez is a 47 y.o. male  : 1970    MRN: [id number]  DATE: 2023  TIME: 4:50 PM    Assessment and Plan   Cutaneous abscess of chest wall [L02.213]  1. Cutaneous abscess of chest wall  doxycycline hyclate (VIBRAMYCIN) 100 mg capsule        Patient Instructions   Monitor site for increased redness, change in rash, increased swelling, or drainage. If this develops or you develop any fever, chills, aches, joint pain, swelling, headache, dizziness, numbness or any new or concerning symptoms please proceed to ER. Take antibiotics as prescribed. Take entire course of antibiotics. Eat yogurt with live and active cultures and/or take a probiotic at least 3 hours before or after antibiotic dose. Monitor stool for diarrhea and/or blood. If this occurs, contact primary care doctor ASAP. Good hand hygiene  Avoid scratching area  Keep area clean and dry    Watch for signs of increased infection as previously discussed    If you develop any facial swelling, shortness of breath, difficulty breathing or any new or concerning symptoms please proceed ER. Follow up with PCP in 3-5 days. Proceed to ER if symptoms worsen. Chief Complaint     Chief Complaint   Patient presents with   • Cellulitis   • Abscess     Abscess to right chest; states he squeezed it and pus came out; now redness and swelling is spreading across right chest      History of Present Illness       Abscess  This is a new problem. The current episode started in the past 7 days. The problem occurs constantly. The problem has been gradually worsening. Pertinent negatives include no chest pain, chills, coughing, diaphoresis, fatigue, fever or myalgias. Review of Systems   Review of Systems   Constitutional:  Negative for chills, diaphoresis, fatigue and fever. Respiratory: Negative. Negative for cough, shortness of breath and wheezing. Cardiovascular: Negative.   Negative for chest pain and palpitations. Musculoskeletal: Negative. Negative for myalgias. Skin:  Positive for color change. Negative for wound. Current Medications       Current Outpatient Medications:   •  doxycycline hyclate (VIBRAMYCIN) 100 mg capsule, Take 1 capsule (100 mg total) by mouth every 12 (twelve) hours for 5 days, Disp: 10 capsule, Rfl: 0  •  albuterol (Proventil HFA) 90 mcg/act inhaler, Inhale 2 puffs every 6 (six) hours as needed for wheezing, Disp: 6.7 g, Rfl: 3  •  clotrimazole (LOTRIMIN) 1 % cream, Apply topically 2 (two) times a day, Disp: 28 g, Rfl: 0  •  escitalopram (LEXAPRO) 20 mg tablet, Take 1 tablet (20 mg total) by mouth daily for 180 doses, Disp: 30 tablet, Rfl: 5  •  meloxicam (MOBIC) 15 mg tablet, Take 1 tablet (15 mg total) by mouth daily, Disp: 30 tablet, Rfl: 1    Current Allergies     Allergies as of 11/22/2023 - Reviewed 11/22/2023   Allergen Reaction Noted   • Sulfa antibiotics Rash 02/20/2016            The following portions of the patient's history were reviewed and updated as appropriate: allergies, current medications, past family history, past medical history, past social history, past surgical history and problem list.     Past Medical History:   Diagnosis Date   • Anxiety    • Disease of thyroid gland    • HL (hearing loss)    • Otitis media    • Sleep apnea    • Sleep difficulties    • Suspected sleep apnea 12/20/2022       Past Surgical History:   Procedure Laterality Date   • APPENDECTOMY  2015   • CHOLECYSTECTOMY     • EAR SURGERY     • STOMACH SURGERY     • TYMPANOPLASTY         Family History   Problem Relation Age of Onset   • Liver disease Mother    • Cerebral aneurysm Mother    • Alcohol abuse Father    • Diabetes Brother    • No Known Problems Brother    • No Known Problems Son    • Substance Abuse Daughter    • No Known Problems Daughter    • No Known Problems Maternal Grandmother          Medications have been verified.         Objective   /80   Pulse 80 Temp 98.9 °F (37.2 °C) (Temporal)   Resp 18   Ht 5' 7" (1.702 m)   Wt 98 kg (216 lb)   SpO2 99%   BMI 33.83 kg/m²        Physical Exam     Physical Exam  Vitals and nursing note reviewed. Constitutional:       General: He is not in acute distress. Appearance: Normal appearance. He is not ill-appearing, toxic-appearing or diaphoretic. HENT:      Head: Normocephalic. Cardiovascular:      Rate and Rhythm: Normal rate and regular rhythm. Pulses: Normal pulses. Heart sounds: Normal heart sounds. No murmur heard. Pulmonary:      Effort: Pulmonary effort is normal. No respiratory distress. Breath sounds: Normal breath sounds. No stridor. No wheezing, rhonchi or rales. Chest:      Chest wall: No tenderness. Musculoskeletal:         General: Normal range of motion. Skin:     General: Skin is warm. Findings: Abscess and erythema present. Neurological:      Mental Status: He is alert.

## 2023-11-22 NOTE — PATIENT INSTRUCTIONS
Monitor site for increased redness, change in rash, increased swelling, or drainage. If this develops or you develop any fever, chills, aches, joint pain, swelling, headache, dizziness, numbness or any new or concerning symptoms please proceed to ER. Take antibiotics as prescribed. Take entire course of antibiotics. Eat yogurt with live and active cultures and/or take a probiotic at least 3 hours before or after antibiotic dose. Monitor stool for diarrhea and/or blood. If this occurs, contact primary care doctor ASAP. Good hand hygiene  Avoid scratching area  Keep area clean and dry    Watch for signs of increased infection as previously discussed    If you develop any facial swelling, shortness of breath, difficulty breathing or any new or concerning symptoms please proceed ER. Follow up with PCP in 3-5 days. Proceed to ER if symptoms worsen.

## 2023-11-27 ENCOUNTER — APPOINTMENT (OUTPATIENT)
Dept: PHYSICAL THERAPY | Facility: MEDICAL CENTER | Age: 53
End: 2023-11-27
Payer: COMMERCIAL

## 2023-11-27 DIAGNOSIS — F41.0 PANIC DISORDER: ICD-10-CM

## 2023-11-27 RX ORDER — ESCITALOPRAM OXALATE 20 MG/1
20 TABLET ORAL DAILY
Qty: 90 TABLET | Refills: 1 | Status: SHIPPED | OUTPATIENT
Start: 2023-11-27 | End: 2024-05-25

## 2023-11-30 ENCOUNTER — OFFICE VISIT (OUTPATIENT)
Dept: FAMILY MEDICINE CLINIC | Facility: MEDICAL CENTER | Age: 53
End: 2023-11-30
Payer: COMMERCIAL

## 2023-11-30 VITALS
HEIGHT: 67 IN | TEMPERATURE: 98.1 F | HEART RATE: 70 BPM | BODY MASS INDEX: 34.21 KG/M2 | WEIGHT: 218 LBS | SYSTOLIC BLOOD PRESSURE: 118 MMHG | DIASTOLIC BLOOD PRESSURE: 74 MMHG | RESPIRATION RATE: 18 BRPM

## 2023-11-30 DIAGNOSIS — L08.9 INFECTED SEBACEOUS CYST OF SKIN: Primary | ICD-10-CM

## 2023-11-30 DIAGNOSIS — L72.3 INFECTED SEBACEOUS CYST OF SKIN: Primary | ICD-10-CM

## 2023-11-30 PROCEDURE — 99213 OFFICE O/P EST LOW 20 MIN: CPT | Performed by: FAMILY MEDICINE

## 2023-11-30 RX ORDER — CLINDAMYCIN HYDROCHLORIDE 300 MG/1
300 CAPSULE ORAL 4 TIMES DAILY
Qty: 30 CAPSULE | Refills: 0 | Status: SHIPPED | OUTPATIENT
Start: 2023-11-30 | End: 2023-12-07

## 2023-11-30 NOTE — PROGRESS NOTES
Assessment/Plan:      Diagnoses and all orders for this visit:    Infected sebaceous cyst of skin  -     clindamycin (CLEOCIN) 300 MG capsule; Take 1 capsule (300 mg total) by mouth 4 (four) times a day for 7 days          Subjective:     Patient ID: Carole Frey is a 47 y.o. male. This 62-year-old man was seen 5 days ago in urgent care. He had a subcutaneous abscess and surrounding cellulitis. He was treated with Bactrim for 5 days and they recommended some warm soaks. Today he is much better but he still has an area of redness superior to the nipple. And there is a nodule under the skin slightly tender. Review of Systems   Constitutional: Negative. HENT: Negative. Eyes: Negative. Respiratory: Negative. Cardiovascular: Negative. Gastrointestinal: Negative. Genitourinary: Negative. Musculoskeletal: Negative. Skin:  Positive for wound. Neurological: Negative. Psychiatric/Behavioral: Negative. Objective:     Physical Exam  Skin:     Findings: Erythema and lesion present. I think it is an infected sebaceous cyst.  The nodule under the skin feels like a sebaceous cyst.  Because of the persistent of redness slightly tenderness I will give him a week clindamycin and I emphasized the need for warm soaks. If the redness and the tenderness is gone, we will just watch it. However after the antibiotics if there is still redness or tenderness I will refer him to general surgery.

## 2023-12-11 ENCOUNTER — OFFICE VISIT (OUTPATIENT)
Dept: FAMILY MEDICINE CLINIC | Facility: MEDICAL CENTER | Age: 53
End: 2023-12-11
Payer: COMMERCIAL

## 2023-12-11 VITALS
HEART RATE: 96 BPM | SYSTOLIC BLOOD PRESSURE: 118 MMHG | HEIGHT: 67 IN | BODY MASS INDEX: 34.21 KG/M2 | RESPIRATION RATE: 16 BRPM | DIASTOLIC BLOOD PRESSURE: 74 MMHG | WEIGHT: 218 LBS

## 2023-12-11 DIAGNOSIS — F41.0 PANIC DISORDER: Primary | ICD-10-CM

## 2023-12-11 DIAGNOSIS — L72.3 INFECTED SEBACEOUS CYST OF SKIN: ICD-10-CM

## 2023-12-11 DIAGNOSIS — L08.9 INFECTED SEBACEOUS CYST OF SKIN: ICD-10-CM

## 2023-12-11 PROCEDURE — 99213 OFFICE O/P EST LOW 20 MIN: CPT | Performed by: FAMILY MEDICINE

## 2023-12-11 RX ORDER — ESCITALOPRAM OXALATE 10 MG/1
10 TABLET ORAL DAILY
Qty: 90 TABLET | Refills: 1 | Status: SHIPPED | OUTPATIENT
Start: 2023-12-11 | End: 2024-06-08

## 2023-12-11 NOTE — PROGRESS NOTES
Name: Rock Blanc      : 1970      MRN: [id number]  Encounter Provider: Naeem East MD  Encounter Date: 2023   Encounter department: [address] [place] [address]    Assessment & Plan     1. Panic disorder  Assessment & Plan:  I was prescribing 20 mg of Lexapro. However he is only taking half tablet because of weight gain and libido. He tells me that that dose at 10 mg is doing well enough for him. Will change it to 10 mg. I will recheck him in 2 to 3 months. Orders:  -     escitalopram (LEXAPRO) 10 mg tablet; Take 1 tablet (10 mg total) by mouth daily for 180 doses    2. Infected sebaceous cyst of skin  Comments: It is much better, responded to antibiotics and warm soaks. Subjective      Review of Systems   Constitutional: Negative. HENT: Negative. Eyes: Negative. Respiratory: Negative. Cardiovascular: Negative. Gastrointestinal: Negative. Genitourinary: Negative. Musculoskeletal: Negative. Neurological: Negative. Psychiatric/Behavioral: Negative. Current Outpatient Medications on File Prior to Visit   Medication Sig   • albuterol (Proventil HFA) 90 mcg/act inhaler Inhale 2 puffs every 6 (six) hours as needed for wheezing   • clotrimazole (LOTRIMIN) 1 % cream Apply topically 2 (two) times a day   • meloxicam (MOBIC) 15 mg tablet Take 1 tablet (15 mg total) by mouth daily   • [DISCONTINUED] escitalopram (LEXAPRO) 20 mg tablet TAKE 1 TABLET (20 MG TOTAL) BY MOUTH DAILY  DOSES       Objective     /74 (BP Location: Left arm, Patient Position: Sitting, Cuff Size: Large)   Pulse 96   Resp 16   Ht 5' 7" (1.702 m)   Wt 98.9 kg (218 lb)   BMI 34.14 kg/m²     Physical Exam  Constitutional:       General: He is not in acute distress. Appearance: He is well-developed. He is not diaphoretic. HENT:      Head: Normocephalic.       Right Ear: External ear normal.      Left Ear: External ear normal.      Nose: Nose normal. Mouth/Throat:      Mouth: Mucous membranes are moist.   Eyes:      Extraocular Movements: Extraocular movements intact. Conjunctiva/sclera: Conjunctivae normal.      Pupils: Pupils are equal, round, and reactive to light. Cardiovascular:      Rate and Rhythm: Normal rate. Pulses: Normal pulses. Pulmonary:      Effort: Pulmonary effort is normal. No respiratory distress. Musculoskeletal:         General: Normal range of motion. Cervical back: Normal range of motion. Skin:     General: Skin is warm and dry. Findings: No lesion (It is much improved in the outer quadrant of the right breast.  It is nearly completely resolved). Neurological:      General: No focal deficit present. Mental Status: He is alert and oriented to person, place, and time. Psychiatric:         Behavior: Behavior normal.         Thought Content:  Thought content normal.         Judgment: Judgment normal.       Margarita Espinoza MD

## 2023-12-11 NOTE — ASSESSMENT & PLAN NOTE
I was prescribing 20 mg of Lexapro. However he is only taking half tablet because of weight gain and libido. He tells me that that dose at 10 mg is doing well enough for him. Will change it to 10 mg. I will recheck him in 2 to 3 months.

## 2023-12-16 ENCOUNTER — OFFICE VISIT (OUTPATIENT)
Dept: URGENT CARE | Facility: MEDICAL CENTER | Age: 53
End: 2023-12-16
Payer: COMMERCIAL

## 2023-12-16 VITALS
HEART RATE: 64 BPM | WEIGHT: 216 LBS | RESPIRATION RATE: 18 BRPM | TEMPERATURE: 98.2 F | SYSTOLIC BLOOD PRESSURE: 115 MMHG | DIASTOLIC BLOOD PRESSURE: 79 MMHG | HEIGHT: 67 IN | OXYGEN SATURATION: 96 % | BODY MASS INDEX: 33.9 KG/M2

## 2023-12-16 DIAGNOSIS — U07.1 COVID-19: Primary | ICD-10-CM

## 2023-12-16 LAB
SARS-COV-2 AG UPPER RESP QL IA: POSITIVE
VALID CONTROL: ABNORMAL

## 2023-12-16 PROCEDURE — G0382 LEV 3 HOSP TYPE B ED VISIT: HCPCS | Performed by: PHYSICIAN ASSISTANT

## 2023-12-16 PROCEDURE — 87811 SARS-COV-2 COVID19 W/OPTIC: CPT | Performed by: PHYSICIAN ASSISTANT

## 2023-12-16 NOTE — PATIENT INSTRUCTIONS
1. Drink plenty fluids. 2.  Use a humidifier at bedtime    3. Over-the-counter medications as needed for symptomatic care. 4.   Advance activities as tolerated. 5.   Follow-up with your primary care physician in 3-4 days. 6.  Go to emergency room if symptoms are worsening.

## 2023-12-16 NOTE — PROGRESS NOTES
[facility]        NAME: Shonda Kerr is a 47 y.o. male  : 1970    MRN: [id number]  DATE: 2023  TIME: 6:29 PM    Assessment and Plan   COVID-19 [U07.1]  1. COVID-19  Poct Covid 19 Rapid Antigen Test            Patient Instructions       Follow up with PCP in 3-5 days. Proceed to  ER if symptoms worsen. Chief Complaint     Chief Complaint   Patient presents with   • Cold Like Symptoms     Pt. With sinus congestion, body aches, and chills that began 3 days ago. Reports a slight cough. History of Present Illness       Patient here for evaluation of sinus congestion, body aches, chills that started 3 days ago. He has a slight cough. Review of Systems   Review of Systems   Constitutional:  Positive for chills. Negative for activity change, appetite change, diaphoresis, fatigue and fever. HENT:  Positive for congestion. Negative for ear discharge, ear pain, postnasal drip, rhinorrhea, sinus pressure, sinus pain and sore throat. Eyes: Negative. Respiratory:  Positive for cough. Negative for choking, chest tightness, shortness of breath and wheezing. Cardiovascular: Negative. Gastrointestinal: Negative. Neurological: Negative.         Current Medications       Current Outpatient Medications:   •  albuterol (Proventil HFA) 90 mcg/act inhaler, Inhale 2 puffs every 6 (six) hours as needed for wheezing, Disp: 6.7 g, Rfl: 3  •  clotrimazole (LOTRIMIN) 1 % cream, Apply topically 2 (two) times a day, Disp: 28 g, Rfl: 0  •  escitalopram (LEXAPRO) 10 mg tablet, Take 1 tablet (10 mg total) by mouth daily for 180 doses, Disp: 90 tablet, Rfl: 1  •  meloxicam (MOBIC) 15 mg tablet, Take 1 tablet (15 mg total) by mouth daily, Disp: 30 tablet, Rfl: 1    Current Allergies     Allergies as of 2023 - Reviewed 2023   Allergen Reaction Noted   • Sulfa antibiotics Rash 2016            The following portions of the patient's history were reviewed and updated as appropriate: allergies, current medications, past family history, past medical history, past social history, past surgical history and problem list.     Past Medical History:   Diagnosis Date   • Anxiety    • Disease of thyroid gland    • HL (hearing loss)    • Otitis media    • Sleep apnea    • Sleep difficulties    • Suspected sleep apnea 12/20/2022       Past Surgical History:   Procedure Laterality Date   • APPENDECTOMY  2015   • CHOLECYSTECTOMY     • EAR SURGERY     • STOMACH SURGERY     • TYMPANOPLASTY         Family History   Problem Relation Age of Onset   • Liver disease Mother    • Cerebral aneurysm Mother    • Alcohol abuse Father    • Diabetes Brother    • No Known Problems Brother    • No Known Problems Son    • Substance Abuse Daughter    • No Known Problems Daughter    • No Known Problems Maternal Grandmother          Medications have been verified. Objective   /79   Pulse 64   Temp 98.2 °F (36.8 °C)   Resp 18   Ht 5' 7" (1.702 m)   Wt 98 kg (216 lb)   SpO2 96%   BMI 33.83 kg/m²   No LMP for male patient. Physical Exam     Physical Exam  Vitals and nursing note reviewed. Constitutional:       General: He is not in acute distress. Appearance: Normal appearance. He is well-developed. He is not ill-appearing, toxic-appearing or diaphoretic. HENT:      Head: Normocephalic and atraumatic. Right Ear: Tympanic membrane and ear canal normal.      Left Ear: Tympanic membrane and ear canal normal.      Nose: No congestion or rhinorrhea. Mouth/Throat:      Mouth: Mucous membranes are moist.      Pharynx: No oropharyngeal exudate or posterior oropharyngeal erythema. Eyes:      General:         Right eye: No discharge. Left eye: No discharge. Extraocular Movements: Extraocular movements intact. Conjunctiva/sclera: Conjunctivae normal.      Pupils: Pupils are equal, round, and reactive to light.    Cardiovascular:      Rate and Rhythm: Normal rate and regular rhythm. Heart sounds: Normal heart sounds. No murmur heard. Pulmonary:      Effort: Pulmonary effort is normal. No respiratory distress. Breath sounds: Normal breath sounds. No stridor. No wheezing, rhonchi or rales. Skin:     General: Skin is warm and dry. Neurological:      General: No focal deficit present. Mental Status: He is alert and oriented to person, place, and time. Psychiatric:         Mood and Affect: Mood normal.         Behavior: Behavior normal.         Thought Content:  Thought content normal.         Judgment: Judgment normal.

## 2024-01-08 ENCOUNTER — TELEPHONE (OUTPATIENT)
Age: 54
End: 2024-01-08

## 2024-01-08 NOTE — TELEPHONE ENCOUNTER
Patient is a NEW and would like an appointment for scarring of the face. I offered cosmetic consultation of $150.00 or if he wanted to wait could be seen in [place] but soonest available is in June.      After discussing patient said to hold for a second on the line. I waited and said hello multiple times, no answer.    I disconnected the call was on the line for 17 minutes.     Patient was added on to wait list to establish as a NP.

## 2024-01-26 ENCOUNTER — OFFICE VISIT (OUTPATIENT)
Dept: PAIN MEDICINE | Facility: CLINIC | Age: 54
End: 2024-01-26
Payer: COMMERCIAL

## 2024-01-26 VITALS
WEIGHT: 216 LBS | DIASTOLIC BLOOD PRESSURE: 92 MMHG | SYSTOLIC BLOOD PRESSURE: 131 MMHG | HEART RATE: 59 BPM | BODY MASS INDEX: 33.83 KG/M2

## 2024-01-26 DIAGNOSIS — M54.16 LUMBAR RADICULOPATHY: ICD-10-CM

## 2024-01-26 DIAGNOSIS — M47.816 LUMBAR SPONDYLOSIS: ICD-10-CM

## 2024-01-26 DIAGNOSIS — J45.20 MILD INTERMITTENT ASTHMA WITHOUT COMPLICATION: ICD-10-CM

## 2024-01-26 DIAGNOSIS — G89.4 CHRONIC PAIN SYNDROME: Primary | ICD-10-CM

## 2024-01-26 PROCEDURE — 99214 OFFICE O/P EST MOD 30 MIN: CPT

## 2024-01-26 NOTE — PROGRESS NOTES
Assessment:  1. Chronic pain syndrome    2. Lumbar radiculopathy    3. Lumbar spondylosis        Plan:  The patient is a 53-year-old male with a history of chronic pain secondary to low back pain, lumbar intervertebral disc disorder with radiculopathy and lumbar spondylosis who presents to the office with ongoing bilateral low back pain, right worse than left that is unchanged since his last office visit.    At this time, I did instruct patient he would need to complete 4 more sessions of physical therapy, and if his low back pain continues, we can order an MRI of his lumbar spine at that time for further evaluation.  He feels like he did not benefit from the 2 sessions of physical therapy that he completed, therefore I recommended chiropractic therapy.  Patient would like to proceed.  A referral was placed and provided to the patient.  Patient was instructed to complete 4 to 6 weeks of chiropractic therapy and if low back pain continues, to call our office at which point I will order an MRI of his lumbar spine.  Patient verbalized understanding.    He can continue his current pain medication regimen, I did instruct patient if he is receiving more relief with taking Aleve, he can continue using this medication.  He can discontinue meloxicam.    My impressions and treatment recommendations were discussed in detail with the patient who verbalized understanding and had no further questions.  Discharge instructions were provided. I personally saw and examined the patient and I agree with the above discussed plan of care.    Orders Placed This Encounter   Procedures    Ambulatory referral to Chiropractic     Standing Status:   Future     Standing Expiration Date:   1/26/2025     Referral Priority:   Routine     Referral Type:   Chiropractic     Referral Reason:   Specialty Services Required     Referred to Provider:   Geraldo Corona DC     Requested Specialty:   Chiropractic Medicine     Number of Visits Requested:   1      Expiration Date:   1/26/2025     No orders of the defined types were placed in this encounter.      History of Present Illness:  Umberto Dominguez is a 53 y.o. male with a history of chronic pain secondary to low back pain, lumbar intervertebral disc disorder with radiculopathy and lumbar spondylosis.  He was last seen on 10/27/2023 where he was instructed to complete a formal physical therapy program as well as started on meloxicam 15 mg daily.  He presents to the office with ongoing bilateral low back pain, right worse than left.  He completed 2 physical therapy sessions, however did not feel like physical therapy was helpful.    He states his pain is the same since the last office visit and constant.  He states his pain is worse with standing and does ease with rest.  He rates quality of his pain as dull/aching and is currently rating his pain a 4-7/10 on a numeric scale.    Current pain medications and include Aleve as needed which is helpful.  He states he did take the meloxicam, however he did not notice much relief.    I have personally reviewed and/or updated the patient's past medical history, past surgical history, family history, social history, current medications, allergies, and vital signs today.     Review of Systems   Respiratory:  Negative for shortness of breath.    Cardiovascular:  Negative for chest pain.   Gastrointestinal:  Negative for constipation, diarrhea, nausea and vomiting.   Musculoskeletal:  Positive for back pain, gait problem and joint swelling. Negative for arthralgias and myalgias.   Skin:  Negative for rash.   Neurological:  Negative for dizziness, seizures and weakness.   All other systems reviewed and are negative.      Patient Active Problem List   Diagnosis    Panic disorder    TSH elevation    Sacroiliac pain    Obesity (BMI 35.0-39.9 without comorbidity)    RAUL (obstructive sleep apnea)    Left breast lump    Subclinical hypothyroidism       Past Medical History:   Diagnosis  Date    Anxiety     Disease of thyroid gland     HL (hearing loss)     Otitis media     Sleep apnea     Sleep difficulties     Suspected sleep apnea 12/20/2022       Past Surgical History:   Procedure Laterality Date    APPENDECTOMY  2015    CHOLECYSTECTOMY      EAR SURGERY      STOMACH SURGERY      TYMPANOPLASTY         Family History   Problem Relation Age of Onset    Liver disease Mother     Cerebral aneurysm Mother     Alcohol abuse Father     Diabetes Brother     No Known Problems Brother     No Known Problems Son     Substance Abuse Daughter     No Known Problems Daughter     No Known Problems Maternal Grandmother        Social History     Occupational History    Not on file   Tobacco Use    Smoking status: Never    Smokeless tobacco: Never   Vaping Use    Vaping status: Never Used   Substance and Sexual Activity    Alcohol use: No    Drug use: No    Sexual activity: Not on file       Current Outpatient Medications on File Prior to Visit   Medication Sig    albuterol (Proventil HFA) 90 mcg/act inhaler Inhale 2 puffs every 6 (six) hours as needed for wheezing    clotrimazole (LOTRIMIN) 1 % cream Apply topically 2 (two) times a day    escitalopram (LEXAPRO) 10 mg tablet Take 1 tablet (10 mg total) by mouth daily for 180 doses    [DISCONTINUED] meloxicam (MOBIC) 15 mg tablet Take 1 tablet (15 mg total) by mouth daily     No current facility-administered medications on file prior to visit.       Allergies   Allergen Reactions    Sulfa Antibiotics Rash       Physical Exam:    /92   Pulse 59   Wt 98 kg (216 lb)   BMI 33.83 kg/m²     Constitutional:normal, well developed, well nourished, alert, in no distress and non-toxic and no overt pain behavior.  Eyes:anicteric  HEENT:grossly intact  Neck:supple, symmetric, trachea midline and no masses   Pulmonary:even and unlabored  Cardiovascular:No edema or pitting edema present  Skin:Normal without rashes or lesions and well hydrated  Psychiatric:Mood and affect  appropriate  Neurologic:Cranial Nerves II-XII grossly intact  Musculoskeletal:normal    Lumbar Spine Exam    Appearance:  Normal lordosis  Palpation/Tenderness:  left lumbar paraspinal tenderness  right lumbar paraspinal tenderness  Sensory:  no sensory deficits noted  Range of Motion:  Flexion:  Minimally limited  with pain  Extension:  Minimally limited  with pain  Motor Strength:  Left hip flexion:  5/5  Right hip flexion:  5/5  Left knee flexion:  5/5  Left knee extension:  5/5  Right knee flexion:  5/5  Right knee extension:  5/5  Left foot dorsiflexion:  5/5  Left foot plantar flexion:  5/5  Right foot dorsiflexion:  5/5  Right foot plantar flexion:  5/5    Imaging

## 2024-01-27 RX ORDER — ALBUTEROL SULFATE 90 UG/1
2 AEROSOL, METERED RESPIRATORY (INHALATION) EVERY 6 HOURS PRN
Qty: 6.7 G | Refills: 3 | Status: SHIPPED | OUTPATIENT
Start: 2024-01-27

## 2024-01-31 ENCOUNTER — OFFICE VISIT (OUTPATIENT)
Age: 54
End: 2024-01-31
Payer: COMMERCIAL

## 2024-01-31 VITALS
BODY MASS INDEX: 33.9 KG/M2 | HEART RATE: 76 BPM | DIASTOLIC BLOOD PRESSURE: 86 MMHG | WEIGHT: 216 LBS | SYSTOLIC BLOOD PRESSURE: 126 MMHG | HEIGHT: 67 IN

## 2024-01-31 DIAGNOSIS — M46.1 SACROILIITIS (HCC): Primary | ICD-10-CM

## 2024-01-31 DIAGNOSIS — M99.03 SEGMENTAL DYSFUNCTION OF LUMBAR REGION: ICD-10-CM

## 2024-01-31 DIAGNOSIS — M54.16 LUMBAR RADICULOPATHY: ICD-10-CM

## 2024-01-31 DIAGNOSIS — M47.816 LUMBAR SPONDYLOSIS: ICD-10-CM

## 2024-01-31 DIAGNOSIS — M99.02 SEGMENTAL DYSFUNCTION OF THORACIC REGION: ICD-10-CM

## 2024-01-31 DIAGNOSIS — M67.951 TENDINOPATHY OF RIGHT GLUTEUS MEDIUS: ICD-10-CM

## 2024-01-31 PROCEDURE — 98941 CHIROPRACT MANJ 3-4 REGIONS: CPT | Performed by: CHIROPRACTOR

## 2024-01-31 PROCEDURE — 97110 THERAPEUTIC EXERCISES: CPT | Performed by: CHIROPRACTOR

## 2024-01-31 PROCEDURE — 99203 OFFICE O/P NEW LOW 30 MIN: CPT | Performed by: CHIROPRACTOR
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
## 2024-01-31 NOTE — Clinical Note
Umberto responded well to a focus on myofascial work and mobility work to R SIJ/hip region; if we plateau, should respond to R SIJ injection; will keep you updated, thanks!

## 2024-02-05 ENCOUNTER — PROCEDURE VISIT (OUTPATIENT)
Age: 54
End: 2024-02-05
Payer: COMMERCIAL

## 2024-02-05 VITALS — HEIGHT: 67 IN | WEIGHT: 216 LBS | BODY MASS INDEX: 33.9 KG/M2

## 2024-02-05 DIAGNOSIS — M99.03 SEGMENTAL DYSFUNCTION OF LUMBAR REGION: ICD-10-CM

## 2024-02-05 DIAGNOSIS — M54.16 LUMBAR RADICULOPATHY: ICD-10-CM

## 2024-02-05 DIAGNOSIS — M67.951 TENDINOPATHY OF RIGHT GLUTEUS MEDIUS: ICD-10-CM

## 2024-02-05 DIAGNOSIS — M99.02 SEGMENTAL DYSFUNCTION OF THORACIC REGION: ICD-10-CM

## 2024-02-05 DIAGNOSIS — M46.1 SACROILIITIS (HCC): ICD-10-CM

## 2024-02-05 DIAGNOSIS — M47.816 LUMBAR SPONDYLOSIS: Primary | ICD-10-CM

## 2024-02-05 PROCEDURE — 97110 THERAPEUTIC EXERCISES: CPT | Performed by: CHIROPRACTOR

## 2024-02-05 PROCEDURE — 98941 CHIROPRACT MANJ 3-4 REGIONS: CPT | Performed by: CHIROPRACTOR
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
## 2024-02-12 ENCOUNTER — PROCEDURE VISIT (OUTPATIENT)
Age: 54
End: 2024-02-12
Payer: COMMERCIAL

## 2024-02-12 VITALS
BODY MASS INDEX: 33.9 KG/M2 | SYSTOLIC BLOOD PRESSURE: 126 MMHG | DIASTOLIC BLOOD PRESSURE: 82 MMHG | HEIGHT: 67 IN | HEART RATE: 81 BPM | WEIGHT: 216 LBS

## 2024-02-12 DIAGNOSIS — M99.02 SEGMENTAL DYSFUNCTION OF THORACIC REGION: ICD-10-CM

## 2024-02-12 DIAGNOSIS — M46.1 SACROILIITIS (HCC): ICD-10-CM

## 2024-02-12 DIAGNOSIS — M54.16 LUMBAR RADICULOPATHY: ICD-10-CM

## 2024-02-12 DIAGNOSIS — M99.03 SEGMENTAL DYSFUNCTION OF LUMBAR REGION: ICD-10-CM

## 2024-02-12 DIAGNOSIS — M67.951 TENDINOPATHY OF RIGHT GLUTEUS MEDIUS: ICD-10-CM

## 2024-02-12 DIAGNOSIS — M47.816 LUMBAR SPONDYLOSIS: Primary | ICD-10-CM

## 2024-02-12 PROCEDURE — 98941 CHIROPRACT MANJ 3-4 REGIONS: CPT | Performed by: CHIROPRACTOR
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
## 2024-02-14 ENCOUNTER — OFFICE VISIT (OUTPATIENT)
Dept: FAMILY MEDICINE CLINIC | Facility: MEDICAL CENTER | Age: 54
End: 2024-02-14
Payer: COMMERCIAL

## 2024-02-14 ENCOUNTER — APPOINTMENT (OUTPATIENT)
Dept: LAB | Facility: MEDICAL CENTER | Age: 54
End: 2024-02-14
Payer: COMMERCIAL

## 2024-02-14 VITALS
BODY MASS INDEX: 34.21 KG/M2 | HEART RATE: 63 BPM | DIASTOLIC BLOOD PRESSURE: 90 MMHG | SYSTOLIC BLOOD PRESSURE: 140 MMHG | WEIGHT: 218 LBS | HEIGHT: 67 IN | TEMPERATURE: 97 F | OXYGEN SATURATION: 98 %

## 2024-02-14 DIAGNOSIS — Z13.220 SCREENING FOR LIPID DISORDERS: ICD-10-CM

## 2024-02-14 DIAGNOSIS — Z12.5 SCREENING FOR PROSTATE CANCER: ICD-10-CM

## 2024-02-14 DIAGNOSIS — Z13.1 SCREENING FOR DIABETES MELLITUS: ICD-10-CM

## 2024-02-14 DIAGNOSIS — E03.8 SUBCLINICAL HYPOTHYROIDISM: ICD-10-CM

## 2024-02-14 DIAGNOSIS — Z13.0 SCREENING FOR IRON DEFICIENCY ANEMIA: ICD-10-CM

## 2024-02-14 DIAGNOSIS — F42.2 MIXED OBSESSIONAL THOUGHTS AND ACTS: Primary | ICD-10-CM

## 2024-02-14 DIAGNOSIS — Z13.29 SCREENING FOR THYROID DISORDER: ICD-10-CM

## 2024-02-14 LAB
ALBUMIN SERPL BCP-MCNC: 4.2 G/DL (ref 3.5–5)
ALP SERPL-CCNC: 46 U/L (ref 34–104)
ALT SERPL W P-5'-P-CCNC: 37 U/L (ref 7–52)
ANION GAP SERPL CALCULATED.3IONS-SCNC: 8 MMOL/L
AST SERPL W P-5'-P-CCNC: 26 U/L (ref 13–39)
BASOPHILS # BLD AUTO: 0.05 THOUSANDS/ÂΜL (ref 0–0.1)
BASOPHILS NFR BLD AUTO: 1 % (ref 0–1)
BILIRUB SERPL-MCNC: 1.23 MG/DL (ref 0.2–1)
BUN SERPL-MCNC: 14 MG/DL (ref 5–25)
CALCIUM SERPL-MCNC: 9.1 MG/DL (ref 8.4–10.2)
CHLORIDE SERPL-SCNC: 105 MMOL/L (ref 96–108)
CHOLEST SERPL-MCNC: 114 MG/DL
CO2 SERPL-SCNC: 27 MMOL/L (ref 21–32)
CREAT SERPL-MCNC: 0.82 MG/DL (ref 0.6–1.3)
EOSINOPHIL # BLD AUTO: 0.21 THOUSAND/ÂΜL (ref 0–0.61)
EOSINOPHIL NFR BLD AUTO: 4 % (ref 0–6)
ERYTHROCYTE [DISTWIDTH] IN BLOOD BY AUTOMATED COUNT: 14.1 % (ref 11.6–15.1)
GFR SERPL CREATININE-BSD FRML MDRD: 101 ML/MIN/1.73SQ M
GLUCOSE P FAST SERPL-MCNC: 88 MG/DL (ref 65–99)
HCT VFR BLD AUTO: 48.1 % (ref 36.5–49.3)
HDLC SERPL-MCNC: 42 MG/DL
HGB BLD-MCNC: 16 G/DL (ref 12–17)
IMM GRANULOCYTES # BLD AUTO: 0.01 THOUSAND/UL (ref 0–0.2)
IMM GRANULOCYTES NFR BLD AUTO: 0 % (ref 0–2)
LDLC SERPL CALC-MCNC: 46 MG/DL (ref 0–100)
LYMPHOCYTES # BLD AUTO: 2.11 THOUSANDS/ÂΜL (ref 0.6–4.47)
LYMPHOCYTES NFR BLD AUTO: 38 % (ref 14–44)
MCH RBC QN AUTO: 28.1 PG (ref 26.8–34.3)
MCHC RBC AUTO-ENTMCNC: 33.3 G/DL (ref 31.4–37.4)
MCV RBC AUTO: 84 FL (ref 82–98)
MONOCYTES # BLD AUTO: 0.45 THOUSAND/ÂΜL (ref 0.17–1.22)
MONOCYTES NFR BLD AUTO: 8 % (ref 4–12)
NEUTROPHILS # BLD AUTO: 2.74 THOUSANDS/ÂΜL (ref 1.85–7.62)
NEUTS SEG NFR BLD AUTO: 49 % (ref 43–75)
NRBC BLD AUTO-RTO: 0 /100 WBCS
PLATELET # BLD AUTO: 248 THOUSANDS/UL (ref 149–390)
PMV BLD AUTO: 11.2 FL (ref 8.9–12.7)
POTASSIUM SERPL-SCNC: 4.3 MMOL/L (ref 3.5–5.3)
PROT SERPL-MCNC: 7 G/DL (ref 6.4–8.4)
PSA SERPL-MCNC: 0.72 NG/ML (ref 0–4)
RBC # BLD AUTO: 5.7 MILLION/UL (ref 3.88–5.62)
SODIUM SERPL-SCNC: 140 MMOL/L (ref 135–147)
T4 FREE SERPL-MCNC: 0.64 NG/DL (ref 0.61–1.12)
TRIGL SERPL-MCNC: 130 MG/DL
TSH SERPL DL<=0.05 MIU/L-ACNC: 6.59 UIU/ML (ref 0.45–4.5)
WBC # BLD AUTO: 5.57 THOUSAND/UL (ref 4.31–10.16)

## 2024-02-14 PROCEDURE — 80053 COMPREHEN METABOLIC PANEL: CPT

## 2024-02-14 PROCEDURE — 36415 COLL VENOUS BLD VENIPUNCTURE: CPT

## 2024-02-14 PROCEDURE — 99213 OFFICE O/P EST LOW 20 MIN: CPT | Performed by: FAMILY MEDICINE

## 2024-02-14 PROCEDURE — 80061 LIPID PANEL: CPT

## 2024-02-14 PROCEDURE — 84443 ASSAY THYROID STIM HORMONE: CPT

## 2024-02-14 PROCEDURE — G0103 PSA SCREENING: HCPCS

## 2024-02-14 PROCEDURE — 84439 ASSAY OF FREE THYROXINE: CPT

## 2024-02-14 PROCEDURE — 85025 COMPLETE CBC W/AUTO DIFF WBC: CPT

## 2024-02-14 NOTE — PROGRESS NOTES
Name: Umberto Dominguez      : 1970      MRN: [id number]  Encounter Provider: Nate Ness MD  Encounter Date: 2024   Encounter department: [facility]    Assessment & Plan     1. Mixed obsessional thoughts and acts  Assessment & Plan:  He is taking Lexapro 10 mg.  It has been effective, however he thinks there is room to move with the dose.  He is seeing a therapist and the therapist suggested that he should increase his Lexapro back up to 20 mg.    He is counseling about intrusive thoughts about his partner is cheating on him.  His ex-wife was.    He is thinking about increasing the Lexapro to 20 mg.  I also suggested that we could use Seroquel or Abilify or something like that, however weight gain will be worse than just increasing the Lexapro.    He is going to think about it and he will get back to me over MyChart.      2. Subclinical hypothyroidism  -     TSH, 3rd generation with Free T4 reflex; Future    3. Screening for iron deficiency anemia  -     CBC and differential; Future    4. Screening for lipid disorders  -     Lipid Panel with Direct LDL reflex; Future    5. Screening for thyroid disorder    6. Screening for diabetes mellitus  -     Comprehensive metabolic panel; Future    7. Screening for prostate cancer  -     PSA, Total Screen; Future           Subjective      Review of Systems   Constitutional: Negative.    HENT: Negative.     Eyes: Negative.    Respiratory: Negative.     Cardiovascular: Negative.    Gastrointestinal: Negative.    Genitourinary: Negative.    Musculoskeletal: Negative.    Neurological: Negative.    Psychiatric/Behavioral:  Positive for dysphoric mood and sleep disturbance. Negative for agitation, behavioral problems, self-injury and suicidal ideas. The patient is nervous/anxious.        Current Outpatient Medications on File Prior to Visit   Medication Sig   • albuterol (Proventil HFA) 90 mcg/act inhaler Inhale 2 puffs every 6 (six) hours as needed  "for wheezing   • clotrimazole (LOTRIMIN) 1 % cream Apply topically 2 (two) times a day   • escitalopram (LEXAPRO) 10 mg tablet Take 1 tablet (10 mg total) by mouth daily for 180 doses       Objective     /90 (BP Location: Left arm, Patient Position: Sitting, Cuff Size: Large)   Pulse 63   Temp (!) 97 °F (36.1 °C)   Ht 5' 7\" (1.702 m)   Wt 98.9 kg (218 lb)   SpO2 98%   BMI 34.14 kg/m²     Physical Exam  Constitutional:       General: He is not in acute distress.     Appearance: He is well-developed. He is not diaphoretic.   HENT:      Head: Normocephalic.      Right Ear: External ear normal.      Left Ear: External ear normal.      Nose: Nose normal.      Mouth/Throat:      Mouth: Mucous membranes are moist.   Eyes:      Extraocular Movements: Extraocular movements intact.      Conjunctiva/sclera: Conjunctivae normal.      Pupils: Pupils are equal, round, and reactive to light.   Cardiovascular:      Rate and Rhythm: Normal rate.      Pulses: Normal pulses.   Pulmonary:      Effort: Pulmonary effort is normal. No respiratory distress.   Musculoskeletal:         General: Normal range of motion.      Cervical back: Normal range of motion.   Skin:     General: Skin is warm and dry.   Neurological:      General: No focal deficit present.      Mental Status: He is alert and oriented to person, place, and time.   Psychiatric:         Behavior: Behavior normal.         Thought Content: Thought content normal.         Judgment: Judgment normal.       Nate Ness MD    "

## 2024-02-14 NOTE — ASSESSMENT & PLAN NOTE
He is taking Lexapro 10 mg.  It has been effective, however he thinks there is room to move with the dose.  He is seeing a therapist and the therapist suggested that he should increase his Lexapro back up to 20 mg.    He is counseling about intrusive thoughts about his partner is cheating on him.  His ex-wife was.    He is thinking about increasing the Lexapro to 20 mg.  I also suggested that we could use Seroquel or Abilify or something like that, however weight gain will be worse than just increasing the Lexapro.    He is going to think about it and he will get back to me over MyChart.

## 2024-03-20 ENCOUNTER — PROCEDURE VISIT (OUTPATIENT)
Age: 54
End: 2024-03-20
Payer: COMMERCIAL

## 2024-03-20 VITALS
WEIGHT: 218 LBS | BODY MASS INDEX: 34.21 KG/M2 | SYSTOLIC BLOOD PRESSURE: 134 MMHG | DIASTOLIC BLOOD PRESSURE: 90 MMHG | HEART RATE: 83 BPM | HEIGHT: 67 IN

## 2024-03-20 DIAGNOSIS — M67.951 TENDINOPATHY OF RIGHT GLUTEUS MEDIUS: ICD-10-CM

## 2024-03-20 DIAGNOSIS — M46.1 SACROILIITIS (HCC): ICD-10-CM

## 2024-03-20 DIAGNOSIS — M54.16 LUMBAR RADICULOPATHY: ICD-10-CM

## 2024-03-20 DIAGNOSIS — M99.03 SEGMENTAL DYSFUNCTION OF LUMBAR REGION: ICD-10-CM

## 2024-03-20 DIAGNOSIS — M99.02 SEGMENTAL DYSFUNCTION OF THORACIC REGION: ICD-10-CM

## 2024-03-20 DIAGNOSIS — M47.816 LUMBAR SPONDYLOSIS: Primary | ICD-10-CM

## 2024-03-20 PROCEDURE — 97110 THERAPEUTIC EXERCISES: CPT | Performed by: CHIROPRACTOR

## 2024-03-20 PROCEDURE — 98941 CHIROPRACT MANJ 3-4 REGIONS: CPT | Performed by: CHIROPRACTOR
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
## 2024-04-05 ENCOUNTER — PROCEDURE VISIT (OUTPATIENT)
Age: 54
End: 2024-04-05
Payer: COMMERCIAL

## 2024-04-05 VITALS
HEIGHT: 67 IN | HEART RATE: 62 BPM | WEIGHT: 218 LBS | SYSTOLIC BLOOD PRESSURE: 113 MMHG | RESPIRATION RATE: 18 BRPM | BODY MASS INDEX: 34.21 KG/M2 | DIASTOLIC BLOOD PRESSURE: 83 MMHG

## 2024-04-05 DIAGNOSIS — M99.02 SEGMENTAL DYSFUNCTION OF THORACIC REGION: ICD-10-CM

## 2024-04-05 DIAGNOSIS — M54.16 LUMBAR RADICULOPATHY: ICD-10-CM

## 2024-04-05 DIAGNOSIS — M46.1 SACROILIITIS (HCC): ICD-10-CM

## 2024-04-05 DIAGNOSIS — M99.03 SEGMENTAL DYSFUNCTION OF LUMBAR REGION: ICD-10-CM

## 2024-04-05 DIAGNOSIS — M67.951 TENDINOPATHY OF RIGHT GLUTEUS MEDIUS: ICD-10-CM

## 2024-04-05 DIAGNOSIS — M47.816 LUMBAR SPONDYLOSIS: Primary | ICD-10-CM

## 2024-04-05 PROCEDURE — 97110 THERAPEUTIC EXERCISES: CPT | Performed by: CHIROPRACTOR

## 2024-04-05 PROCEDURE — 98941 CHIROPRACT MANJ 3-4 REGIONS: CPT | Performed by: CHIROPRACTOR
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
## 2024-05-10 ENCOUNTER — APPOINTMENT (OUTPATIENT)
Dept: RADIOLOGY | Facility: MEDICAL CENTER | Age: 54
End: 2024-05-10
Payer: COMMERCIAL

## 2024-05-10 ENCOUNTER — OFFICE VISIT (OUTPATIENT)
Dept: FAMILY MEDICINE CLINIC | Facility: MEDICAL CENTER | Age: 54
End: 2024-05-10
Payer: COMMERCIAL

## 2024-05-10 VITALS
DIASTOLIC BLOOD PRESSURE: 78 MMHG | SYSTOLIC BLOOD PRESSURE: 114 MMHG | BODY MASS INDEX: 34.02 KG/M2 | TEMPERATURE: 97.8 F | WEIGHT: 217.2 LBS | OXYGEN SATURATION: 97 % | RESPIRATION RATE: 18 BRPM | HEART RATE: 75 BPM

## 2024-05-10 DIAGNOSIS — Z87.898 HISTORY OF WHEEZING: ICD-10-CM

## 2024-05-10 DIAGNOSIS — J06.9 VIRAL URI WITH COUGH: Primary | ICD-10-CM

## 2024-05-10 PROCEDURE — 71046 X-RAY EXAM CHEST 2 VIEWS: CPT

## 2024-05-10 PROCEDURE — 99213 OFFICE O/P EST LOW 20 MIN: CPT | Performed by: STUDENT IN AN ORGANIZED HEALTH CARE EDUCATION/TRAINING PROGRAM

## 2024-05-10 PROCEDURE — 87636 SARSCOV2 & INF A&B AMP PRB: CPT | Performed by: STUDENT IN AN ORGANIZED HEALTH CARE EDUCATION/TRAINING PROGRAM

## 2024-05-10 RX ORDER — BENZONATATE 100 MG/1
100 CAPSULE ORAL 3 TIMES DAILY PRN
Qty: 20 CAPSULE | Refills: 0 | Status: SHIPPED | OUTPATIENT
Start: 2024-05-10

## 2024-05-10 NOTE — PROGRESS NOTES
[facility]  Talon Comer DO, 05/10/24     Umberto Dominguez MRN: [id number] : 1970 Age: 53 y.o.     Assessment/Plan     1. Viral URI with cough    -Advised about supportive care measures, fluids, rest  -Vicks vapor rub  -Flonase  -Tessalon Perles per orders as needed  -DayQuil/NyQuil  - Covid/Flu- Office Collect Normal  - benzonatate (TESSALON PERLES) 100 mg capsule; Take 1 capsule (100 mg total) by mouth 3 (three) times a day as needed for cough  Dispense: 20 capsule; Refill: 0  -Call if any new or worsening symptoms or if symptoms not improving    2. History of wheezing    - XR chest pa & lateral; Future      Umberto Dominguez acknowledged understanding of treatment plan, all questions answered.    Subjective      Umberto Dominguez is a 53 y.o. male who presents for acute visit.  He is a patient of Nate Ness MD. Onset of symptoms about a week ago.  Patient complaining of cough, nonproductive.  He also complains of nasal congestion, rhinorrhea, headache, sore throat.  Occasional wheezing, using albuterol inhaler.  Patient worries about work exposures, as he works with tar/concrete.  No fever. No myalgias.  Colleague at work was ill with similar symptoms.  Patient took Aleve.    The following portions of the patient's history were reviewed and updated as appropriate: allergies, current medications, past family history, past medical history, past social history, past surgical history and problem list.     Past Medical History:   Diagnosis Date    Anxiety     Arthritis     Lower back    Disease of thyroid gland     HL (hearing loss)     Otitis media     Sleep apnea     Sleep difficulties     Suspected sleep apnea 2022       Allergies   Allergen Reactions    Sulfa Antibiotics Rash       Past Surgical History:   Procedure Laterality Date    APPENDECTOMY  2015    CHOLECYSTECTOMY      EAR SURGERY      STOMACH SURGERY      TYMPANOPLASTY         Family History   Problem Relation Age of Onset    Liver  disease Mother     Cerebral aneurysm Mother     Alcohol abuse Father     Diabetes Brother     No Known Problems Brother     No Known Problems Son     Substance Abuse Daughter     No Known Problems Daughter     No Known Problems Maternal Grandmother        Social History     Socioeconomic History    Marital status:      Spouse name: None    Number of children: None    Years of education: None    Highest education level: None   Occupational History    None   Tobacco Use    Smoking status: Never    Smokeless tobacco: Never   Vaping Use    Vaping status: Never Used   Substance and Sexual Activity    Alcohol use: No    Drug use: No    Sexual activity: Not Currently     Partners: Female     Comment: Girlfriend   Other Topics Concern    None   Social History Narrative    Caffeine use     Social Determinants of Health     Financial Resource Strain: Not on file   Food Insecurity: Not on file   Transportation Needs: Not on file   Physical Activity: Not on file   Stress: Not on file   Social Connections: Not on file   Intimate Partner Violence: Not on file   Housing Stability: Not on file       Current Outpatient Medications   Medication Sig Dispense Refill    albuterol (Proventil HFA) 90 mcg/act inhaler Inhale 2 puffs every 6 (six) hours as needed for wheezing 6.7 g 3    escitalopram (LEXAPRO) 10 mg tablet Take 1 tablet (10 mg total) by mouth daily for 180 doses 90 tablet 1    clotrimazole (LOTRIMIN) 1 % cream Apply topically 2 (two) times a day (Patient not taking: Reported on 5/10/2024) 28 g 0     No current facility-administered medications for this visit.       Review of Systems     As noted in HPI    Objective      /78 (BP Location: Left arm, Patient Position: Sitting, Cuff Size: Standard)   Pulse 75   Temp 97.8 °F (36.6 °C) (Temporal)   Resp 18   Wt 98.5 kg (217 lb 3.2 oz)   SpO2 97%   BMI 34.02 kg/m²     Physical Exam  Vitals reviewed.   Constitutional:       General: He is not in acute distress.     " Appearance: He is obese. He is ill-appearing. He is not toxic-appearing.   HENT:      Head: Normocephalic and atraumatic.      Right Ear: Tympanic membrane, ear canal and external ear normal.      Left Ear: Tympanic membrane, ear canal and external ear normal.      Nose: Congestion and rhinorrhea present.      Mouth/Throat:      Mouth: Mucous membranes are moist.      Pharynx: Oropharynx is clear. No oropharyngeal exudate or posterior oropharyngeal erythema.   Eyes:      General:         Right eye: No discharge.         Left eye: No discharge.      Extraocular Movements: Extraocular movements intact.      Conjunctiva/sclera: Conjunctivae normal.      Pupils: Pupils are equal, round, and reactive to light.   Cardiovascular:      Rate and Rhythm: Normal rate and regular rhythm.      Pulses: Normal pulses.      Heart sounds: Normal heart sounds.   Pulmonary:      Effort: Pulmonary effort is normal. No respiratory distress.      Breath sounds: Normal breath sounds. No wheezing or rales.   Musculoskeletal:      Cervical back: Neck supple. No rigidity.   Lymphadenopathy:      Cervical: No cervical adenopathy.   Neurological:      Mental Status: He is alert.             Some portions of this record may have been generated with voice recognition software. There may be translation, syntax, or grammatical errors. Occasional wrong word or \"sound-a-like\" substitutions may have occurred due to the inherent limitations of the voice recognition software. Read the chart carefully and recognize, using context, where substations may have occurred. If you have any questions, please contact the dictating provider for clarification or correction, as needed.  "

## 2024-05-12 LAB
FLUAV RNA RESP QL NAA+PROBE: NEGATIVE
FLUBV RNA RESP QL NAA+PROBE: NEGATIVE
SARS-COV-2 RNA RESP QL NAA+PROBE: NEGATIVE

## 2024-06-10 ENCOUNTER — OFFICE VISIT (OUTPATIENT)
Dept: URGENT CARE | Facility: MEDICAL CENTER | Age: 54
End: 2024-06-10
Payer: COMMERCIAL

## 2024-06-10 VITALS
OXYGEN SATURATION: 98 % | HEIGHT: 67 IN | BODY MASS INDEX: 34.06 KG/M2 | TEMPERATURE: 98 F | HEART RATE: 70 BPM | SYSTOLIC BLOOD PRESSURE: 113 MMHG | RESPIRATION RATE: 18 BRPM | DIASTOLIC BLOOD PRESSURE: 78 MMHG | WEIGHT: 217 LBS

## 2024-06-10 DIAGNOSIS — T63.304A SPIDER BITE WOUND, UNDETERMINED INTENT, INITIAL ENCOUNTER: ICD-10-CM

## 2024-06-10 DIAGNOSIS — L03.115 CELLULITIS OF RIGHT LOWER EXTREMITY: Primary | ICD-10-CM

## 2024-06-10 PROCEDURE — G0382 LEV 3 HOSP TYPE B ED VISIT: HCPCS | Performed by: PHYSICIAN ASSISTANT

## 2024-06-10 RX ORDER — CEPHALEXIN 500 MG/1
500 CAPSULE ORAL EVERY 6 HOURS SCHEDULED
Qty: 28 CAPSULE | Refills: 0 | Status: SHIPPED | OUTPATIENT
Start: 2024-06-10 | End: 2024-06-17

## 2024-06-10 NOTE — PATIENT INSTRUCTIONS
Cellulitis right leg  Keflex as directed  Follow up with PCP in 3-5 days.  Proceed to  ER if symptoms worsen.Cellulitis   WHAT YOU NEED TO KNOW:   Cellulitis is a skin infection caused by bacteria. Cellulitis is common and can become severe. Cellulitis usually appears on the lower legs. It can also appear on the arms, face, and other areas. Cellulitis develops when bacteria enter a crack or break in your skin, such as a scratch, bite, or cut.       DISCHARGE INSTRUCTIONS:   Return to the emergency department if:   Your wound gets larger and more painful.    You feel a crackling under your skin when you touch it.    You have purple dots or bumps on your skin, or you see bleeding under your skin.    You see red streaks coming from the infected area.    Call your doctor if:   The red, warm, swollen area gets larger.    Your fever or pain does not go away or gets worse.    The area does not get smaller after 3 days of antibiotics.    You have questions or concerns about your condition or care.    Medicines:  You should start to see improvement in 3 days. If cellulitis is not treated, the infection can spread through your body and become life-threatening. You may need any of the following medicines:  Antibiotics  help treat a bacterial infection.    Acetaminophen  decreases pain and fever. It is available without a doctor's order. Ask how much to take and how often to take it. Follow directions. Read the labels of all other medicines you are using to see if they also contain acetaminophen, or ask your doctor or pharmacist. Acetaminophen can cause liver damage if not taken correctly.    NSAIDs , such as ibuprofen, help decrease swelling, pain, and fever. This medicine is available with or without a doctor's order. NSAIDs can cause stomach bleeding or kidney problems in certain people. If you take blood thinner medicine, always ask your healthcare provider if NSAIDs are safe for you. Always read the medicine label and  follow directions.    Take your medicine as directed.  Contact your healthcare provider if you think your medicine is not helping or if you have side effects. Tell your provider if you are allergic to any medicine. Keep a list of the medicines, vitamins, and herbs you take. Include the amounts, and when and why you take them. Bring the list or the pill bottles to follow-up visits. Carry your medicine list with you in case of an emergency.    Self-care:   Wash the area with soap and water every day.  Gently pat dry. Use bandages if directed by your healthcare provider.    Apply cream or ointment as directed.  These help protect the area. Most over-the-counter products, such as petroleum jelly, are good to use. Ask your healthcare provider about specific creams or ointments you should use.    Place a cool, damp cloth on the area.  Use clean cloths and clean water. You can do this as often as you need to. Cool, damp cloths may help decrease pain.    Elevate the area above the level of your heart  as often as you can. This will help decrease swelling and pain. Prop the area on pillows or blankets to keep it elevated comfortably.       Prevent cellulitis:   Do not scratch bug bites or areas of injury.  You increase your risk for cellulitis by scratching these areas.    Do not share personal items, such as towels, clothing, and razors.    Clean exercise equipment  with germ-killing  before and after you use it.    Treat athlete's foot.  This can help prevent the spread of a bacterial skin infection.    Wash your hands often.  Use soap and water. Wash your hands after you use the bathroom, change a child's diapers, or sneeze. Wash your hands before you prepare or eat food. Use lotion to prevent dry, cracked skin.       Follow up with your doctor within 3 days, or as directed:  He or she will check if your cellulitis is getting better. Write down your questions so you remember to ask them during your visits.  ©  Copyright [employer] 2023 Information is for End User's use only and may not be sold, redistributed or otherwise used for commercial purposes.  The above information is an  only. It is not intended as medical advice for individual conditions or treatments. Talk to your doctor, nurse or pharmacist before following any medical regimen to see if it is safe and effective for you.

## 2024-06-10 NOTE — PROGRESS NOTES
[facility]        NAME: Umberto Dominguez is a 53 y.o. male  : 1970    MRN: [id number]  DATE: Gisele 10, 2024  TIME: 4:26 PM    Assessment and Plan   Cellulitis of right lower extremity [L03.115]  1. Cellulitis of right lower extremity  cephalexin (KEFLEX) 500 mg capsule      2. Spider bite wound, undetermined intent, initial encounter              Patient Instructions     Cellulitis right leg  Keflex as directed  Follow up with PCP in 3-5 days.  Proceed to  ER if symptoms worsen.    Chief Complaint     Chief Complaint   Patient presents with    Insect Bite     Patient states he believes he was bite by a spider to right leg/knee x1 week ago; redness, pain, swollen          History of Present Illness       53-year-old male who presents complaining of having had a spider bite him on the right side of the leg a few days ago.  Patient states that over the last 2 days the area has become red, warm, painful.  Has been taking over-the-counter medicine for pain control.  Denies fevers, chills.    Insect Bite  Associated symptoms include a rash. Pertinent negatives include no chest pain or coughing.       Review of Systems   Review of Systems   Constitutional: Negative.    HENT: Negative.     Eyes: Negative.    Respiratory: Negative.  Negative for apnea, cough, choking, chest tightness, shortness of breath, wheezing and stridor.    Cardiovascular: Negative.  Negative for chest pain.   Skin:  Positive for rash.         Current Medications       Current Outpatient Medications:     albuterol (Proventil HFA) 90 mcg/act inhaler, Inhale 2 puffs every 6 (six) hours as needed for wheezing, Disp: 6.7 g, Rfl: 3    cephalexin (KEFLEX) 500 mg capsule, Take 1 capsule (500 mg total) by mouth every 6 (six) hours for 7 days, Disp: 28 capsule, Rfl: 0    escitalopram (LEXAPRO) 10 mg tablet, Take 1 tablet (10 mg total) by mouth daily for 180 doses, Disp: 90 tablet, Rfl: 1    benzonatate (TESSALON PERLES) 100 mg capsule, Take 1 capsule  "(100 mg total) by mouth 3 (three) times a day as needed for cough, Disp: 20 capsule, Rfl: 0    clotrimazole (LOTRIMIN) 1 % cream, Apply topically 2 (two) times a day (Patient not taking: Reported on 5/10/2024), Disp: 28 g, Rfl: 0    Current Allergies     Allergies as of 06/10/2024 - Reviewed 05/10/2024   Allergen Reaction Noted    Sulfa antibiotics Rash 02/20/2016            The following portions of the patient's history were reviewed and updated as appropriate: allergies, current medications, past family history, past medical history, past social history, past surgical history and problem list.     Past Medical History:   Diagnosis Date    Anxiety     Arthritis     Lower back    Disease of thyroid gland     HL (hearing loss)     Otitis media     Sleep apnea     Sleep difficulties     Suspected sleep apnea 12/20/2022       Past Surgical History:   Procedure Laterality Date    APPENDECTOMY  2015    CHOLECYSTECTOMY      EAR SURGERY      STOMACH SURGERY      TYMPANOPLASTY         Family History   Problem Relation Age of Onset    Liver disease Mother     Cerebral aneurysm Mother     Alcohol abuse Father     Diabetes Brother     No Known Problems Brother     No Known Problems Son     Substance Abuse Daughter     No Known Problems Daughter     No Known Problems Maternal Grandmother          Medications have been verified.        Objective   /78   Pulse 70   Temp 98 °F (36.7 °C)   Resp 18   Ht 5' 7\" (1.702 m)   Wt 98.4 kg (217 lb)   SpO2 98%   BMI 33.99 kg/m²        Physical Exam     Physical Exam  Constitutional:       General: He is not in acute distress.     Appearance: Normal appearance. He is well-developed. He is not diaphoretic.   Cardiovascular:      Rate and Rhythm: Normal rate and regular rhythm.      Heart sounds: Normal heart sounds.   Pulmonary:      Effort: Pulmonary effort is normal. No respiratory distress.      Breath sounds: Normal breath sounds. No wheezing or rales.   Chest:      Chest " wall: No tenderness.   Musculoskeletal:      Cervical back: Normal range of motion and neck supple.   Lymphadenopathy:      Cervical: No cervical adenopathy.   Skin:         Neurological:      Mental Status: He is alert.

## 2024-07-05 DIAGNOSIS — F41.0 PANIC DISORDER: ICD-10-CM

## 2024-07-05 RX ORDER — ESCITALOPRAM OXALATE 10 MG/1
10 TABLET ORAL DAILY
Qty: 30 TABLET | Refills: 5 | Status: SHIPPED | OUTPATIENT
Start: 2024-07-05 | End: 2025-01-01

## 2024-07-31 ENCOUNTER — PROCEDURE VISIT (OUTPATIENT)
Age: 54
End: 2024-07-31
Payer: COMMERCIAL

## 2024-07-31 VITALS
WEIGHT: 217 LBS | DIASTOLIC BLOOD PRESSURE: 85 MMHG | HEIGHT: 67 IN | SYSTOLIC BLOOD PRESSURE: 123 MMHG | BODY MASS INDEX: 34.06 KG/M2 | HEART RATE: 83 BPM

## 2024-07-31 DIAGNOSIS — M46.1 SACROILIITIS (HCC): ICD-10-CM

## 2024-07-31 DIAGNOSIS — M99.03 SEGMENTAL DYSFUNCTION OF LUMBAR REGION: ICD-10-CM

## 2024-07-31 DIAGNOSIS — M47.816 LUMBAR SPONDYLOSIS: Primary | ICD-10-CM

## 2024-07-31 DIAGNOSIS — M99.02 SEGMENTAL DYSFUNCTION OF THORACIC REGION: ICD-10-CM

## 2024-07-31 DIAGNOSIS — M54.16 LUMBAR RADICULOPATHY: ICD-10-CM

## 2024-07-31 DIAGNOSIS — M67.951 TENDINOPATHY OF RIGHT GLUTEUS MEDIUS: ICD-10-CM

## 2024-07-31 PROCEDURE — 98941 CHIROPRACT MANJ 3-4 REGIONS: CPT | Performed by: CHIROPRACTOR

## 2024-07-31 PROCEDURE — 97110 THERAPEUTIC EXERCISES: CPT | Performed by: CHIROPRACTOR
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
## 2024-08-14 ENCOUNTER — PROCEDURE VISIT (OUTPATIENT)
Age: 54
End: 2024-08-14
Payer: COMMERCIAL

## 2024-08-14 VITALS
WEIGHT: 217 LBS | HEART RATE: 54 BPM | HEIGHT: 67 IN | DIASTOLIC BLOOD PRESSURE: 72 MMHG | SYSTOLIC BLOOD PRESSURE: 117 MMHG | BODY MASS INDEX: 34.06 KG/M2

## 2024-08-14 DIAGNOSIS — M47.816 LUMBAR SPONDYLOSIS: Primary | ICD-10-CM

## 2024-08-14 DIAGNOSIS — M54.16 LUMBAR RADICULOPATHY: ICD-10-CM

## 2024-08-14 DIAGNOSIS — M67.951 TENDINOPATHY OF RIGHT GLUTEUS MEDIUS: ICD-10-CM

## 2024-08-14 DIAGNOSIS — M46.1 SACROILIITIS (HCC): ICD-10-CM

## 2024-08-14 DIAGNOSIS — M99.02 SEGMENTAL DYSFUNCTION OF THORACIC REGION: ICD-10-CM

## 2024-08-14 DIAGNOSIS — M99.03 SEGMENTAL DYSFUNCTION OF LUMBAR REGION: ICD-10-CM

## 2024-08-14 PROCEDURE — 97110 THERAPEUTIC EXERCISES: CPT | Performed by: CHIROPRACTOR

## 2024-08-14 PROCEDURE — 98941 CHIROPRACT MANJ 3-4 REGIONS: CPT | Performed by: CHIROPRACTOR
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
## 2024-08-18 ENCOUNTER — OFFICE VISIT (OUTPATIENT)
Dept: URGENT CARE | Facility: MEDICAL CENTER | Age: 54
End: 2024-08-18
Payer: COMMERCIAL

## 2024-08-18 VITALS
DIASTOLIC BLOOD PRESSURE: 73 MMHG | OXYGEN SATURATION: 97 % | SYSTOLIC BLOOD PRESSURE: 133 MMHG | HEART RATE: 61 BPM | TEMPERATURE: 97.9 F | RESPIRATION RATE: 18 BRPM

## 2024-08-18 DIAGNOSIS — L03.115 CELLULITIS OF RIGHT LOWER EXTREMITY: Primary | ICD-10-CM

## 2024-08-18 PROCEDURE — 87186 SC STD MICRODIL/AGAR DIL: CPT

## 2024-08-18 PROCEDURE — G0383 LEV 4 HOSP TYPE B ED VISIT: HCPCS

## 2024-08-18 PROCEDURE — 87070 CULTURE OTHR SPECIMN AEROBIC: CPT

## 2024-08-18 PROCEDURE — 87147 CULTURE TYPE IMMUNOLOGIC: CPT

## 2024-08-18 PROCEDURE — 87205 SMEAR GRAM STAIN: CPT

## 2024-08-18 RX ORDER — DOXYCYCLINE 100 MG/1
100 CAPSULE ORAL EVERY 12 HOURS SCHEDULED
Qty: 14 CAPSULE | Refills: 0 | Status: SHIPPED | OUTPATIENT
Start: 2024-08-18 | End: 2024-08-25

## 2024-08-18 NOTE — PROGRESS NOTES
[facility]        NAME: Umberto Dominguez is a 54 y.o. male  : 1970    MRN: [id number]  DATE: 2024  TIME: 3:05 PM    Assessment and Plan   Cellulitis of right lower extremity [L03.115]  1. Cellulitis of right lower extremity  Wound culture and Gram stain    doxycycline hyclate (VIBRAMYCIN) 100 mg capsule        Wound culture and gram stain obtained, if antibiotics need to be changed based on culture and sensitivity will contact patient as discussed.     Patient Instructions   Skin marked with skin marker.     Monitor site for signs of infection including but not limited to increased redness, swelling, discharge, drainage, streaking, or if you develop any fever, body ache, chills, new joint pain or swelling, headache or dizziness, please proceed to the ER.       Take antibiotics as prescribed.   Take entire course of antibiotics.      Eat yogurt with live and active cultures and/or take a probiotic at least 3 hours before or after antibiotic dose.   Monitor stool for diarrhea and/or blood. If this occurs, contact primary care doctor ASAP.      Good hand hygiene  Avoid scratching area  Keep area clean and dry  Warm compresses     Follow up with PCP in 3-5 days.   Proceed to ER if symptoms worsen.    If tests are performed, our office will contact you with results only if changes need to made to the care plan discussed with you at the visit. [facility]    Chief Complaint     Chief Complaint   Patient presents with    Spider Bite     Spider bite to right leg; occurred a few days ago; redness, swelling, headache          History of Present Illness       HPI    Review of Systems   Review of Systems      Current Medications       Current Outpatient Medications:     doxycycline hyclate (VIBRAMYCIN) 100 mg capsule, Take 1 capsule (100 mg total) by mouth every 12 (twelve) hours for 7 days, Disp: 14 capsule, Rfl: 0    albuterol (Proventil HFA) 90 mcg/act  inhaler, Inhale 2 puffs every 6 (six) hours as needed for wheezing, Disp: 6.7 g, Rfl: 3    benzonatate (TESSALON PERLES) 100 mg capsule, Take 1 capsule (100 mg total) by mouth 3 (three) times a day as needed for cough, Disp: 20 capsule, Rfl: 0    clotrimazole (LOTRIMIN) 1 % cream, Apply topically 2 (two) times a day (Patient not taking: Reported on 5/10/2024), Disp: 28 g, Rfl: 0    escitalopram (LEXAPRO) 10 mg tablet, TAKE 1 TABLET (10 MG TOTAL) BY MOUTH DAILY  DOSES, Disp: 30 tablet, Rfl: 5    Current Allergies     Allergies as of 08/18/2024 - Reviewed 08/18/2024   Allergen Reaction Noted    Sulfa antibiotics Rash 02/20/2016            The following portions of the patient's history were reviewed and updated as appropriate: allergies, current medications, past family history, past medical history, past social history, past surgical history and problem list.     Past Medical History:   Diagnosis Date    Anxiety     Arthritis     Lower back    Disease of thyroid gland     HL (hearing loss)     Otitis media     Sleep apnea     Sleep difficulties     Suspected sleep apnea 12/20/2022       Past Surgical History:   Procedure Laterality Date    APPENDECTOMY  2015    CHOLECYSTECTOMY      EAR SURGERY      STOMACH SURGERY      TYMPANOPLASTY         Family History   Problem Relation Age of Onset    Liver disease Mother     Cerebral aneurysm Mother     Alcohol abuse Father     Diabetes Brother     No Known Problems Brother     No Known Problems Son     Substance Abuse Daughter     No Known Problems Daughter     No Known Problems Maternal Grandmother          Medications have been verified.        Objective   /73   Pulse 61   Temp 97.9 °F (36.6 °C)   Resp 18   SpO2 97%        Physical Exam     Physical Exam                      Family History   Problem Relation Age of Onset    Liver disease Mother     Cerebral aneurysm Mother     Alcohol abuse Father     Diabetes Brother     No Known Problems Brother     No Known Problems Son     Substance Abuse Daughter     No Known Problems Daughter     No Known Problems Maternal Grandmother          Medications have been verified.        Objective   /73   Pulse 61   Temp 97.9 °F (36.6 °C)   Resp 18   SpO2 97%        Physical Exam     Physical Exam  Vitals and nursing note reviewed.   Constitutional:       General: He is not in acute distress.     Appearance: Normal appearance. He is not ill-appearing, toxic-appearing or diaphoretic.   HENT:      Head: Normocephalic.   Cardiovascular:      Rate and Rhythm: Normal rate and regular rhythm.      Pulses: Normal pulses.      Heart sounds: Normal heart sounds. No murmur heard.  Pulmonary:      Effort: Pulmonary effort is normal. No respiratory distress.      Breath sounds: Normal breath sounds. No stridor. No wheezing, rhonchi or rales.   Chest:      Chest wall: No tenderness.   Skin:     General: Skin is warm.      Findings: Erythema and wound present.      Comments: See attached media/photo,  R lateral aspect of calf 6.5 cm x 7.5 cm    Neurological:      Mental Status: He is alert.

## 2024-08-18 NOTE — PATIENT INSTRUCTIONS
Skin marked with skin marker.     Monitor site for signs of infection including but not limited to increased redness, swelling, discharge, drainage, streaking, or if you develop any fever, body ache, chills, new joint pain or swelling, headache or dizziness, please proceed to the ER.       Take antibiotics as prescribed.   Take entire course of antibiotics.      Eat yogurt with live and active cultures and/or take a probiotic at least 3 hours before or after antibiotic dose.   Monitor stool for diarrhea and/or blood. If this occurs, contact primary care doctor ASAP.      Good hand hygiene  Avoid scratching area  Keep area clean and dry  Warm compresses     Follow up with PCP in 3-5 days.   Proceed to ER if symptoms worsen.    If tests are performed, our office will contact you with results only if changes need to made to the care plan discussed with you at the visit. You can review your full results on [facility].

## 2024-08-21 LAB
BACTERIA WND AEROBE CULT: ABNORMAL
GRAM STN SPEC: ABNORMAL

## 2024-08-26 ENCOUNTER — TELEPHONE (OUTPATIENT)
Age: 54
End: 2024-08-26

## 2024-08-26 NOTE — TELEPHONE ENCOUNTER
Patient seen at urgent care 818 post insect bite on right leg; had wound culture done and was notified of a staph infection. Patient prescribed doxycycline and he will take last dose this evening. Patient report area does look improved and skin is peeling. Patient wants to know if the antibiotic was adequate to resolve infection. Please review wound culture results and follow up with patient.    Last OV 5/10.

## 2024-08-27 NOTE — TELEPHONE ENCOUNTER
Left message for patient to make follow up appointment for evaluation of insect bite and to establish care with PCP.

## 2024-09-04 ENCOUNTER — OFFICE VISIT (OUTPATIENT)
Dept: FAMILY MEDICINE CLINIC | Facility: MEDICAL CENTER | Age: 54
End: 2024-09-04
Payer: COMMERCIAL

## 2024-09-04 VITALS
BODY MASS INDEX: 34.09 KG/M2 | WEIGHT: 217.2 LBS | HEART RATE: 65 BPM | RESPIRATION RATE: 16 BRPM | SYSTOLIC BLOOD PRESSURE: 104 MMHG | DIASTOLIC BLOOD PRESSURE: 78 MMHG | OXYGEN SATURATION: 99 % | TEMPERATURE: 98.4 F | HEIGHT: 67 IN

## 2024-09-04 DIAGNOSIS — M25.551 RIGHT HIP PAIN: Primary | ICD-10-CM

## 2024-09-04 DIAGNOSIS — E03.8 SUBCLINICAL HYPOTHYROIDISM: ICD-10-CM

## 2024-09-04 PROCEDURE — 3725F SCREEN DEPRESSION PERFORMED: CPT | Performed by: STUDENT IN AN ORGANIZED HEALTH CARE EDUCATION/TRAINING PROGRAM

## 2024-09-04 PROCEDURE — 99214 OFFICE O/P EST MOD 30 MIN: CPT | Performed by: STUDENT IN AN ORGANIZED HEALTH CARE EDUCATION/TRAINING PROGRAM
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
## 2024-09-09 ENCOUNTER — APPOINTMENT (OUTPATIENT)
Dept: LAB | Facility: MEDICAL CENTER | Age: 54
End: 2024-09-09
Payer: COMMERCIAL

## 2024-09-09 DIAGNOSIS — E03.8 SUBCLINICAL HYPOTHYROIDISM: Primary | ICD-10-CM

## 2024-09-09 DIAGNOSIS — E03.8 SUBCLINICAL HYPOTHYROIDISM: ICD-10-CM

## 2024-09-09 LAB
T4 FREE SERPL-MCNC: 0.71 NG/DL (ref 0.61–1.12)
TSH SERPL DL<=0.05 MIU/L-ACNC: 9.14 UIU/ML (ref 0.45–4.5)

## 2024-09-09 PROCEDURE — 84443 ASSAY THYROID STIM HORMONE: CPT

## 2024-09-09 PROCEDURE — 84439 ASSAY OF FREE THYROXINE: CPT

## 2024-09-09 PROCEDURE — 36415 COLL VENOUS BLD VENIPUNCTURE: CPT

## 2024-09-10 ENCOUNTER — TELEPHONE (OUTPATIENT)
Dept: FAMILY MEDICINE CLINIC | Facility: MEDICAL CENTER | Age: 54
End: 2024-09-10

## 2024-09-10 NOTE — TELEPHONE ENCOUNTER
----- Message from Talon Comer DO sent at 9/10/2024  8:17 AM EDT -----  Please inform patient thyroid function testing remains in subclinical hypothyroidism range.  I would recommend medication if TSH above 10 and/or if symptomatic.  Would like to repeat thyroid function testing in 3 months along with thyroid antibodies   panel.  Orders placed.  Should patient develop any excessive fatigue, weight gain, constipation, hair fall, brittle nails, cold intolerance he should contact me sooner to discuss treatment.

## 2024-09-10 NOTE — TELEPHONE ENCOUNTER
Pt called in, results and recommendations from Dr. Sawant were relayed to him. Pt understood and agreed to let us know if he develops any of those symptoms. He will do his recheck in 3 months.

## 2024-09-16 ENCOUNTER — OFFICE VISIT (OUTPATIENT)
Dept: FAMILY MEDICINE CLINIC | Facility: MEDICAL CENTER | Age: 54
End: 2024-09-16
Payer: COMMERCIAL

## 2024-09-16 ENCOUNTER — APPOINTMENT (OUTPATIENT)
Dept: RADIOLOGY | Facility: MEDICAL CENTER | Age: 54
End: 2024-09-16
Payer: COMMERCIAL

## 2024-09-16 ENCOUNTER — OFFICE VISIT (OUTPATIENT)
Dept: OBGYN CLINIC | Facility: MEDICAL CENTER | Age: 54
End: 2024-09-16
Payer: COMMERCIAL

## 2024-09-16 VITALS
BODY MASS INDEX: 34.21 KG/M2 | DIASTOLIC BLOOD PRESSURE: 81 MMHG | SYSTOLIC BLOOD PRESSURE: 131 MMHG | WEIGHT: 218 LBS | HEART RATE: 60 BPM | HEIGHT: 67 IN

## 2024-09-16 VITALS
TEMPERATURE: 97.7 F | WEIGHT: 219.6 LBS | HEART RATE: 59 BPM | SYSTOLIC BLOOD PRESSURE: 110 MMHG | HEIGHT: 67 IN | DIASTOLIC BLOOD PRESSURE: 80 MMHG | OXYGEN SATURATION: 98 % | RESPIRATION RATE: 18 BRPM | BODY MASS INDEX: 34.47 KG/M2

## 2024-09-16 DIAGNOSIS — Z23 ENCOUNTER FOR IMMUNIZATION: ICD-10-CM

## 2024-09-16 DIAGNOSIS — M25.551 RIGHT HIP PAIN: ICD-10-CM

## 2024-09-16 DIAGNOSIS — Z00.00 ANNUAL PHYSICAL EXAM: Primary | ICD-10-CM

## 2024-09-16 DIAGNOSIS — M53.3 PAIN OF RIGHT SACROILIAC JOINT: Primary | ICD-10-CM

## 2024-09-16 PROBLEM — J45.20 MILD INTERMITTENT ASTHMA WITHOUT COMPLICATION: Status: ACTIVE | Noted: 2024-09-16

## 2024-09-16 PROCEDURE — 90471 IMMUNIZATION ADMIN: CPT

## 2024-09-16 PROCEDURE — 90715 TDAP VACCINE 7 YRS/> IM: CPT

## 2024-09-16 PROCEDURE — 99204 OFFICE O/P NEW MOD 45 MIN: CPT | Performed by: STUDENT IN AN ORGANIZED HEALTH CARE EDUCATION/TRAINING PROGRAM

## 2024-09-16 PROCEDURE — 99396 PREV VISIT EST AGE 40-64: CPT | Performed by: STUDENT IN AN ORGANIZED HEALTH CARE EDUCATION/TRAINING PROGRAM

## 2024-09-16 PROCEDURE — 73502 X-RAY EXAM HIP UNI 2-3 VIEWS: CPT

## 2024-09-16 RX ORDER — MELOXICAM 7.5 MG/1
7.5 TABLET ORAL DAILY
Qty: 30 TABLET | Refills: 1 | Status: SHIPPED | OUTPATIENT
Start: 2024-09-16

## 2024-09-16 NOTE — PROGRESS NOTES
[facility] HEALTH MAINTENANCE OFFICE VISIT  [facility] [place]    NAME: Umberto Dominguez  AGE: 54 y.o. SEX: male  : 1970     DATE: 2024    Assessment and Plan     1. Annual physical exam  2. Encounter for immunization  -     TDAP VACCINE GREATER THAN OR EQUAL TO 8YO IM  3. BMI 34.0-34.9,adult      Patient Counseling:   Nutrition: Stressed importance of a well balanced diet, moderation of sodium/saturated fat, caloric balance and sufficient intake of fiber  Exercise: Stressed the importance of regular exercise with a goal of 150 minutes per week  Dental Health: Discussed daily flossing and brushing and regular dental visits   Injury Prevention: Discussed Safety Belts, Safety Helmets, and Smoke Detectors  Counseled about sunscreen use and sun protection  Immunizations reviewed:  Counseled about tetanus booster, agreeable to receiving today   Declined other recommended vaccinations  Discussed benefits of:    Patient states he underwent colonoscopy in [facility] perhaps 3 to 5 years ago he surmises, he will obtain documents at home for clarification.  He mentions there was 1 colonic polyp removed.  No family history of prostate cancer  BMI Counseling: Body mass index is 34.39 kg/m². Discussed with patient's BMI with him.     Follow-up in 3 months and sooner as needed.  Chief Complaint     Chief Complaint   Patient presents with    Physical Exam     annual       History of Present Illness     HPI    Well Adult Physical   Patient here for a comprehensive physical exam.      Diet and Physical Activity  Diet: chicken, fish, pasta, vegetables   Exercise: physically active but no formal exercise routine      Depression Screen  PHQ-2/9 Depression Screening    Little interest or pleasure in doing things: 1 - several days  Feeling down, depressed, or hopeless: 1 - several days  PHQ-2 Score: 2  PHQ-2 Interpretation: Negative depression screen          General  Health  Hearing: Slighty decreased: right  Vision: goes for regular eye exams and wears glasses  Dental: regular dental visits, brushes teeth twice daily, and flosses teeth occasionally    Reproductive Health  No issues       The following portions of the patient's history were reviewed and updated as appropriate: allergies, current medications, past family history, past medical history, past social history, past surgical history and problem list.    Review of Systems     Review of Systems    As noted in HPI     Past Medical History     Past Medical History:   Diagnosis Date    Anxiety     Arthritis     Lower back    Disease of thyroid gland     HL (hearing loss)     Otitis media     Sleep apnea     Sleep difficulties     Suspected sleep apnea 12/20/2022       Past Surgical History     Past Surgical History:   Procedure Laterality Date    APPENDECTOMY  2015    CHOLECYSTECTOMY      EAR SURGERY      STOMACH SURGERY      TYMPANOPLASTY         Social History     Social History     Socioeconomic History    Marital status:      Spouse name: None    Number of children: None    Years of education: None    Highest education level: None   Occupational History    None   Tobacco Use    Smoking status: Never    Smokeless tobacco: Never   Vaping Use    Vaping status: Never Used   Substance and Sexual Activity    Alcohol use: No    Drug use: No    Sexual activity: Not Currently     Partners: Female     Comment: Girlfriend   Other Topics Concern    None   Social History Narrative    Caffeine use     Social Determinants of Health     Financial Resource Strain: Not on file   Food Insecurity: Not on file   Transportation Needs: Not on file   Physical Activity: Not on file   Stress: Not on file   Social Connections: Not on file   Intimate Partner Violence: Not on file   Housing Stability: Not on file       Family History     Family History   Problem Relation Age of Onset    Liver disease Mother     Cerebral aneurysm Mother      "Alcohol abuse Father     Diabetes Brother     No Known Problems Brother     No Known Problems Son     Substance Abuse Daughter     No Known Problems Daughter     No Known Problems Maternal Grandmother        Current Medications       Current Outpatient Medications:     albuterol (Proventil HFA) 90 mcg/act inhaler, Inhale 2 puffs every 6 (six) hours as needed for wheezing, Disp: 6.7 g, Rfl: 3    escitalopram (LEXAPRO) 10 mg tablet, TAKE 1 TABLET (10 MG TOTAL) BY MOUTH DAILY  DOSES, Disp: 30 tablet, Rfl: 5    meloxicam (Mobic) 7.5 mg tablet, Take 1 tablet (7.5 mg total) by mouth daily (Patient not taking: Reported on 9/16/2024), Disp: 30 tablet, Rfl: 1     Allergies     Allergies   Allergen Reactions    Sulfa Antibiotics Rash       Objective     /80 (BP Location: Left arm, Patient Position: Sitting, Cuff Size: Large)   Pulse 59   Temp 97.7 °F (36.5 °C) (Temporal)   Resp 18   Ht 5' 7\" (1.702 m)   Wt 99.6 kg (219 lb 9.6 oz)   SpO2 98%   BMI 34.39 kg/m²      Physical Exam  Vitals reviewed.   Constitutional:       General: He is not in acute distress.     Appearance: Normal appearance. He is obese.   HENT:      Head: Normocephalic and atraumatic.      Right Ear: External ear normal.      Left Ear: External ear normal.      Nose: Nose normal.      Mouth/Throat:      Mouth: Mucous membranes are moist.      Pharynx: Oropharynx is clear.   Eyes:      Extraocular Movements: Extraocular movements intact.      Conjunctiva/sclera: Conjunctivae normal.      Pupils: Pupils are equal, round, and reactive to light.   Neck:      Thyroid: No thyromegaly or thyroid tenderness.   Cardiovascular:      Rate and Rhythm: Normal rate and regular rhythm.      Pulses: Normal pulses.      Heart sounds: Normal heart sounds.   Pulmonary:      Effort: Pulmonary effort is normal.      Breath sounds: Normal breath sounds.   Abdominal:      General: Bowel sounds are normal.      Palpations: Abdomen is soft.      Tenderness: There " is no abdominal tenderness.   Musculoskeletal:      Cervical back: Neck supple.      Right lower leg: No edema.      Left lower leg: No edema.   Lymphadenopathy:      Cervical: No cervical adenopathy.   Skin:     General: Skin is warm and dry.      Capillary Refill: Capillary refill takes less than 2 seconds.   Neurological:      Mental Status: He is alert and oriented to person, place, and time.   Psychiatric:         Mood and Affect: Mood normal.         Behavior: Behavior normal.         Thought Content: Thought content normal.         Judgment: Judgment normal.           No results found.        Talon Comer DO  [facility]
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
## 2024-09-16 NOTE — PATIENT INSTRUCTIONS
"Patient Education     Routine physical for adults   The Basics   Written by the doctors and editors at [facility]   What is a physical? -- A physical is a routine visit, or \"check-up,\" with your doctor. You might also hear it called a \"wellness visit\" or \"preventive visit.\"  During each visit, the doctor will:   Ask about your physical and mental health   Ask about your habits, behaviors, and lifestyle   Do an exam   Give you vaccines if needed   Talk to you about any medicines you take   Give advice about your health   Answer your questions  Getting regular check-ups is an important part of taking care of your health. It can help your doctor find and treat any problems you have. But it's also important for preventing health problems.  A routine physical is different from a \"sick visit.\" A sick visit is when you see a doctor because of a health concern or problem. Since physicals are scheduled ahead of time, you can think about what you want to ask the doctor.  How often should I get a physical? -- It depends on your age and health. In general, for people age 21 years and older:   If you are younger than 50 years, you might be able to get a physical every 3 years.   If you are 50 years or older, your doctor might recommend a physical every year.  If you have an ongoing health condition, like diabetes or high blood pressure, your doctor will probably want to see you more often.  What happens during a physical? -- In general, each visit will include:   Physical exam - The doctor or nurse will check your height, weight, heart rate, and blood pressure. They will also look at your eyes and ears. They will ask about how you are feeling and whether you have any symptoms that bother you.   Medicines - It's a good idea to bring a list of all the medicines you take to each doctor visit. Your doctor will talk to you about your medicines and answer any questions. Tell them if you are having any side effects that bother you. You " "should also tell them if you are having trouble paying for any of your medicines.   Habits and behaviors - This includes:   Your diet   Your exercise habits   Whether you smoke, drink alcohol, or use drugs   Whether you are sexually active   Whether you feel safe at home  Your doctor will talk to you about things you can do to improve your health and lower your risk of health problems. They will also offer help and support. For example, if you want to quit smoking, they can give you advice and might prescribe medicines. If you want to improve your diet or get more physical activity, they can help you with this, too.   Lab tests, if needed - The tests you get will depend on your age and situation. For example, your doctor might want to check your:   Cholesterol   Blood sugar   Iron level   Vaccines - The recommended vaccines will depend on your age, health, and what vaccines you already had. Vaccines are very important because they can prevent certain serious or deadly infections.   Discussion of screening - \"Screening\" means checking for diseases or other health problems before they cause symptoms. Your doctor can recommend screening based on your age, risk, and preferences. This might include tests to check for:   Cancer, such as breast, prostate, cervical, ovarian, colorectal, prostate, lung, or skin cancer   Sexually transmitted infections, such as chlamydia and gonorrhea   Mental health conditions like depression and anxiety  Your doctor will talk to you about the different types of screening tests. They can help you decide which screenings to have. They can also explain what the results might mean.   Answering questions - The physical is a good time to ask the doctor or nurse questions about your health. If needed, they can refer you to other doctors or specialists, too.  Adults older than 65 years often need other care, too. As you get older, your doctor will talk to you about:   How to prevent falling at " "home   Hearing or vision tests   Memory testing   How to take your medicines safely   Making sure that you have the help and support you need at home  All topics are updated as new evidence becomes available and our peer review process is complete.  This topic retrieved from [employer] on: May 02, 2024.  Topic 970090 Version 1.0  Release: 32.4.3 - C32.122  © 2024 UpToDate, Inc. and/or its affiliates. All rights reserved.  Consumer Information Use and Disclaimer   Disclaimer: This generalized information is a limited summary of diagnosis, treatment, and/or medication information. It is not meant to be comprehensive and should be used as a tool to help the user understand and/or assess potential diagnostic and treatment options. It does NOT include all information about conditions, treatments, medications, side effects, or risks that may apply to a specific patient. It is not intended to be medical advice or a substitute for the medical advice, diagnosis, or treatment of a health care provider based on the health care provider's examination and assessment of a patient's specific and unique circumstances. Patients must speak with a health care provider for complete information about their health, medical questions, and treatment options, including any risks or benefits regarding use of medications. This information does not endorse any treatments or medications as safe, effective, or approved for treating a specific patient. UpToDate, Inc. and its affiliates disclaim any warranty or liability relating to this information or the use thereof.The use of this information is governed by the Terms of Use, available at https://www.wolterskluwer.com/en/know/clinical-effectiveness-terms. 2024© UpToDate, Inc. and its affiliates and/or licensors. All rights reserved.  Copyright   © 2024 UpToDate, Inc. and/or its affiliates. All rights reserved.    Patient Education     Weight Loss Diet   About this topic   There are many \"trendy\" " weight loss diets that are popular today. Many of these diets can end up being more harmful than helpful. The healthiest way to lose weight is to burn more calories than you eat.  A weight loss diet should help you have a healthy view of eating. It is NOT healthy to stop eating to try and lose weight. A good diet plan will help you cut down your food intake and make healthy choices.  A healthy weight loss goal is 1 to 2 pounds (0.5 to 1 kg) per week. Reducing calories in your diet, burning calories through exercise, or both can help you lose weight. Combining a healthy diet with regular physical activity can help you get the best results.  To cut calories in your diet you can:  Switch from whole milk to 1% or skim milk.  Switch from regular cheese to low-fat or fat-free cheese.  Use healthier condiment choices:  Fat-free or low-fat sour cream or salad dressings  Spray butter  Diet syrups or jellies over regular  Try frozen yogurt as a dessert rather than eating ice cream.  Skip the chips. Snack on carrots, vegetables, or fruit. If chips are a favorite of yours, try the baked style and watch portion size.  Eat grilled, roasted, boiled, broiled, or baked meats. Avoid deep-frying. Choose skinless poultry, lean red meat, lean cuts of pork, and fish for good protein sources.  Try flavored no-calorie horta. Do not drink soda and juices that have many calories.  Choose fruit instead of sweets.  General   Eating smaller meals more often may be helpful. This will keep you from overeating at your next meal. Also, eating meals slowly helps you feel full faster.  If eating 3 meals is a part of your lifestyle, choose more lean proteins and higher fiber foods to fill you up at each meal.  Do not skip meals. Most often if you skip a meal, you eat too much at the next meal.  Eat smaller portions. Use a smaller plate or bowl for meals, and when you are eating out, eat half and take the rest home.  Plan ahead. Plan your meals and  grocery list before going to the store. Planning will keep you from getting meals from restaurants.  Do not go to the grocery store hungry. You are more likely to buy snacks that are not good for you.  Portion out snacks. When you are having a snack, instead of grabbing the whole bag, portion a small amount out to give yourself a stopping point.  Drink water before and after your meals to help fill you up without the calories.  When eating starchy foods, choose whole-grain products. These have a lot of fiber which will make you feel full. Fiber also helps lower cholesterol and helps with bowel function.  If you need a helpful start, ask your doctor to send you to a dietitian for weight loss help.         What will the results be?   Losing excess weight will make your whole body healthier. You will have more energy for your daily activities and lower your risk for health problems.  What lifestyle changes are needed?   Stay active. Eating healthy is not always enough to lose weight. Burning calories by exercising is a big part of weight loss.  What foods are good to eat?   The key is to watch your portion sizes. It is best to choose foods that are lower in fat and calories.  Choose lean meats:  Boneless, skinless chicken breast  Pork loin  90% lean beef  Lean turkey meat  Fresh fish (not fried)  Choose low-fat dairy products:  1% or skim milk  Spray butter or margarine  Low-fat or fat-free cheese  Frozen yogurt or low-calorie ice cream  Choose fresh fruits, vegetables, beans and lentils, and whole wheat products more often.  Choose water to drink more often. Drink diet or no-calorie beverages when you want something other than water. Aim to get your calories from the foods you eat.  Choose smart snacks:  Fruits  Vegetables  Low-fat or nonfat yogurt  Low-fat or no-fat cheese, such as cottage cheese  Unsalted nuts  Hard-boiled egg  Hummus  Guacamole  Natural peanut butter  Popcorn with no butter - use pepper, garlic, or  another spice to taste  Whole grain crackers  What foods should be limited or avoided?   Limit high-fat, high-sodium, and high-calorie foods like:  Fried foods  Processed meats  Whole-fat dairy products  Candy, cookies, chips, pastries  Sausage, comer, any full-fat meats  Soda, juice  Beer, wine, and mixed drinks (alcohol)  Will there be any other care needed?   What do I do first before trying to lose weight?  Talk to your doctor and dietitian to see if you need to lose weight. Work with them to set your weight loss goals.  If you have a chronic illness, such as high blood sugar or high blood pressure, ask a doctor or dietitian what diet and exercise is right for you.  Ask your doctor about how much you are able to exercise and what type of exercise is good for you.  Helpful tips   Keep a food journal to help keep you on track.  Join a support group.  Tips for burning calories:  If your workplace is near your house, choose to walk or bike to work instead of driving.  Take 20-minute walks each day. Walk around during your lunch break. You will not only burn calories, but will raise your energy for the rest of the day.  Take the stairs over the elevator.  Join a gym or exercise class with a friend.  Try to exercise 30 minutes a day for overall health. Three 10-minute sessions work too. Aim for 60 to 90 minutes a day to lose weight.  Drink lots of water before, during, and after exercise.  Last Reviewed Date   2021-06-24  Consumer Information Use and Disclaimer   This generalized information is a limited summary of diagnosis, treatment, and/or medication information. It is not meant to be comprehensive and should be used as a tool to help the user understand and/or assess potential diagnostic and treatment options. It does NOT include all information about conditions, treatments, medications, side effects, or risks that may apply to a specific patient. It is not intended to be medical advice or a substitute for the  medical advice, diagnosis, or treatment of a health care provider based on the health care provider's examination and assessment of a patient’s specific and unique circumstances. Patients must speak with a health care provider for complete information about their health, medical questions, and treatment options, including any risks or benefits regarding use of medications. This information does not endorse any treatments or medications as safe, effective, or approved for treating a specific patient. UpToDate, Inc. and its affiliates disclaim any warranty or liability relating to this information or the use thereof. The use of this information is governed by the Terms of Use, available at https://www.Beaminger.com/en/know/clinical-effectiveness-terms   Copyright   Copyright © 2024 UpToDate, Inc. and its affiliates and/or licensors. All rights reserved.

## 2024-10-09 ENCOUNTER — TELEPHONE (OUTPATIENT)
Dept: FAMILY MEDICINE CLINIC | Facility: MEDICAL CENTER | Age: 54
End: 2024-10-09

## 2024-10-09 NOTE — TELEPHONE ENCOUNTER
Left a detailed message for the pt and asked if he had any luck locating his colonoscopy records.     ----- Message from Fernanda NG sent at 10/8/2024  8:03 AM EDT -----  Regarding: Colonoscopy  Please reach out to patient to obtain colonoscopy results.  ----- Message -----  From: Talon Comer DO  Sent: 9/16/2024   4:07 PM EDT  To: [facility] Clerical; #    Patient will be locating colonoscopy documents and be reporting to office, please update to chart

## 2024-11-24 DIAGNOSIS — M53.3 PAIN OF RIGHT SACROILIAC JOINT: ICD-10-CM

## 2024-11-26 DIAGNOSIS — J45.20 MILD INTERMITTENT ASTHMA WITHOUT COMPLICATION: ICD-10-CM

## 2024-11-26 RX ORDER — ALBUTEROL SULFATE 90 UG/1
2 INHALANT RESPIRATORY (INHALATION) EVERY 6 HOURS PRN
Qty: 6.7 G | Refills: 1 | Status: SHIPPED | OUTPATIENT
Start: 2024-11-26

## 2024-11-26 RX ORDER — MELOXICAM 7.5 MG/1
7.5 TABLET ORAL DAILY
Qty: 30 TABLET | Refills: 1 | Status: SHIPPED | OUTPATIENT
Start: 2024-11-26

## 2024-11-26 NOTE — TELEPHONE ENCOUNTER
Reason for call:   [x] Refill   [] Prior Auth  [] Other:     Office:   [x] PCP/Provider -   [] Specialty/Provider -     Medication: albuterol (Proventil HFA) 90 mcg/act inhaler / : Inhale 2 puffs every 6 (six) hours as needed for wheezing    Pharmacy: [id number] - [place], [place] - [address]     Does the patient have enough for 3 days?   [] Yes   [x] No - Send as HP to POD

## 2024-12-16 ENCOUNTER — TELEMEDICINE (OUTPATIENT)
Dept: OTHER | Facility: HOSPITAL | Age: 54
End: 2024-12-16

## 2024-12-16 DIAGNOSIS — J30.81 ALLERGIC RHINITIS DUE TO ANIMAL HAIR AND DANDER: Primary | ICD-10-CM

## 2024-12-16 PROCEDURE — ECARE PR SL URGENT CARE VIRTUAL VISIT: Performed by: NURSE PRACTITIONER

## 2024-12-16 RX ORDER — CETIRIZINE HYDROCHLORIDE 10 MG/1
10 TABLET ORAL DAILY
Qty: 14 TABLET | Refills: 0 | Status: SHIPPED | OUTPATIENT
Start: 2024-12-16 | End: 2024-12-30

## 2024-12-16 RX ORDER — OLOPATADINE HYDROCHLORIDE 1 MG/ML
1 SOLUTION/ DROPS OPHTHALMIC 2 TIMES DAILY
Qty: 5 ML | Refills: 0 | Status: SHIPPED | OUTPATIENT
Start: 2024-12-16 | End: 2024-12-24

## 2024-12-16 NOTE — PATIENT INSTRUCTIONS
I suspect this is allergies.  This could be related to the new dog in the house.  Start zyrtec and allergy eyedrops to see if there is improvement.  If no improvement follow up with PCP.

## 2024-12-16 NOTE — PROGRESS NOTES
Virtual Regular Visit  Name: Umberto Dominguez      : 1970      MRN: [id number]  Encounter Provider: FOSTER Still  Encounter Date: 2024   Encounter department: VIRTUAL CARE       Verification of patient location:  Patient is located at Home in the following state in which I hold an active license [place] :  Assessment & Plan  Allergic rhinitis due to animal hair and dander    Orders:    cetirizine (ZyrTEC) 10 mg tablet; Take 1 tablet (10 mg total) by mouth daily for 14 days    olopatadine (PATANOL) 0.1 % ophthalmic solution; Administer 1 drop to both eyes 2 (two) times a day for 7 days        Encounter provider FOSTER Still    The patient was identified by name and date of birth. Umberto Dominguez was informed that this is a telemedicine visit and that the visit is being conducted through the Epic Embedded platform. He agrees to proceed..  My office door was closed. No one else was in the room.  He acknowledged consent and understanding of privacy and security of the video platform. The patient has agreed to participate and understands they can discontinue the visit at any time.    Patient is aware this is a billable service.     History was obtained from: History obtained from: patient  History of Present Illness     This is a 54 year old male here today for video visit.  He states he is having congestion and itchy eyes.  He states he recently got a rescue dog.  He was given  xyzal and he has been taking 1/2 pill but it made him feel anxious.  1-2 weeks ago is when the symptoms started.  He does state this was around the time they got the rescue dog.  No sinus pressure and he does not feel unwell.      He did have a question about his lexapro and weight gain and dose changes.  Patient is aware he should follow up with PCP to discuss.       Review of Systems   Constitutional:  Negative for activity change, chills, fatigue and fever.   HENT:  Positive for rhinorrhea. Negative for congestion, sinus  pressure and sinus pain.    Eyes:  Positive for redness and itching.   Respiratory:  Negative for cough.    Cardiovascular: Negative.    Neurological: Negative.    Psychiatric/Behavioral: Negative.         Objective   There were no vitals taken for this visit.    Physical Exam  HENT:      Head: Normocephalic and atraumatic.      Nose: Congestion present.   Eyes:      Comments: Bilateral eye mildly injected   Pulmonary:      Effort: Pulmonary effort is normal. No respiratory distress.   Neurological:      Mental Status: He is alert and oriented to person, place, and time.   Psychiatric:         Mood and Affect: Mood normal.         Behavior: Behavior normal.         Thought Content: Thought content normal.         Judgment: Judgment normal.         Visit Time  Total Visit Duration: 6 minutes not including the time spent for establishing the audio/video connection.

## 2024-12-24 ENCOUNTER — OFFICE VISIT (OUTPATIENT)
Dept: URGENT CARE | Facility: CLINIC | Age: 54
End: 2024-12-24
Payer: COMMERCIAL

## 2024-12-24 VITALS
TEMPERATURE: 96.8 F | HEART RATE: 63 BPM | DIASTOLIC BLOOD PRESSURE: 85 MMHG | OXYGEN SATURATION: 100 % | SYSTOLIC BLOOD PRESSURE: 120 MMHG | RESPIRATION RATE: 14 BRPM

## 2024-12-24 DIAGNOSIS — J30.89 ENVIRONMENTAL AND SEASONAL ALLERGIES: Primary | ICD-10-CM

## 2024-12-24 PROCEDURE — 99213 OFFICE O/P EST LOW 20 MIN: CPT

## 2024-12-24 RX ORDER — PREDNISONE 20 MG/1
40 TABLET ORAL DAILY
Qty: 10 TABLET | Refills: 0 | Status: SHIPPED | OUTPATIENT
Start: 2024-12-24 | End: 2024-12-29

## 2024-12-24 RX ORDER — FLUTICASONE PROPIONATE 50 MCG
1 SPRAY, SUSPENSION (ML) NASAL DAILY
Qty: 9.9 ML | Refills: 0 | Status: SHIPPED | OUTPATIENT
Start: 2024-12-24

## 2024-12-24 RX ORDER — OLOPATADINE HYDROCHLORIDE 2 MG/ML
1 SOLUTION/ DROPS OPHTHALMIC DAILY
Qty: 2.5 ML | Refills: 0 | Status: SHIPPED | OUTPATIENT
Start: 2024-12-24

## 2024-12-24 NOTE — PROGRESS NOTES
[facility]        NAME: Umberto Dominguez is a 54 y.o. male  : 1970    MRN: [id number]  DATE: 2024  TIME: 8:46 AM    Assessment and Plan   Environmental and seasonal allergies [J30.89]  1. Environmental and seasonal allergies  predniSONE 20 mg tablet    olopatadine HCl (Pataday) 0.2 % opth drops    fluticasone (FLONASE) 50 mcg/act nasal spray        PE consistent with environmental allergies - will add on steroid and stronger anti-histamine drops. VSS in clinic, appears in no acute distress. Educated on use of OTC products for additional relief of symptoms. Advised close follow-up with PCP or to report to the ER if symptoms worsen. Patient verbalizes understanding and agreeable to plan.       Patient Instructions     Apply drops to affected eye(s) as directed the next 7 days and take prednisone as directed for next 5 days. May continue oral (zyrtec, benadryl, claritin) or nasal (flonase) decongestants as needed for additional symptom. Follow-up with PCP in 3-5 days if no improvement of symptoms. Report to ER if symptoms worsen.       Chief Complaint     Chief Complaint   Patient presents with    Allergies     Allergy symptoms x 3 weeks, eye redness and congestion.  OTC meds not effective.          History of Present Illness       54 year old male presents for evaluation of congestion and itchy eyes x 3 weeks. He denies any known sick contacts but suspects symptoms are secondary to seasonal allergies. He denies fevers, productive sputum, or SOB. He reports waking up in the morning with his eyes crusted. He denies ear pain, sore throat, or visual disturbances. He was seen by his PCP who prescribed pataday and recommended zyrtec, which he's been using with minimal symptom relief.    URI   This is a recurrent problem. The current episode started 1 to 4 weeks ago. The problem has been unchanged. There has been no fever. Associated symptoms include congestion, coughing and rhinorrhea. Pertinent  negatives include no abdominal pain, chest pain, diarrhea, dysuria, ear pain, headaches, joint pain, joint swelling, nausea, neck pain, plugged ear sensation, rash, sinus pain, sneezing, sore throat, swollen glands, vomiting or wheezing. He has tried antihistamine for the symptoms. The treatment provided mild relief.       Review of Systems   Review of Systems   Constitutional:  Negative for activity change, appetite change, chills, fatigue and fever.   HENT:  Positive for congestion, postnasal drip and rhinorrhea. Negative for ear discharge, ear pain, sinus pressure, sinus pain, sneezing, sore throat and trouble swallowing.    Eyes:  Negative for visual disturbance.   Respiratory:  Positive for cough. Negative for chest tightness, shortness of breath and wheezing.    Cardiovascular:  Negative for chest pain and palpitations.   Gastrointestinal:  Negative for abdominal pain, constipation, diarrhea, nausea and vomiting.   Genitourinary:  Negative for dysuria.   Musculoskeletal:  Negative for arthralgias, back pain, joint pain, myalgias and neck pain.   Skin:  Negative for color change, pallor and rash.   Allergic/Immunologic: Positive for environmental allergies. Negative for food allergies.   Neurological:  Negative for dizziness, light-headedness and headaches.         Current Medications       Current Outpatient Medications:     albuterol (Proventil HFA) 90 mcg/act inhaler, Inhale 2 puffs every 6 (six) hours as needed for wheezing, Disp: 6.7 g, Rfl: 1    cetirizine (ZyrTEC) 10 mg tablet, Take 1 tablet (10 mg total) by mouth daily for 14 days, Disp: 14 tablet, Rfl: 0    escitalopram (LEXAPRO) 10 mg tablet, TAKE 1 TABLET (10 MG TOTAL) BY MOUTH DAILY  DOSES, Disp: 30 tablet, Rfl: 5    fluticasone (FLONASE) 50 mcg/act nasal spray, 1 spray into each nostril daily, Disp: 9.9 mL, Rfl: 0    olopatadine HCl (Pataday) 0.2 % opth drops, Administer 1 drop to both eyes daily, Disp: 2.5 mL, Rfl: 0    predniSONE 20 mg  tablet, Take 2 tablets (40 mg total) by mouth daily for 5 days, Disp: 10 tablet, Rfl: 0    meloxicam (MOBIC) 7.5 mg tablet, TAKE 1 TABLET BY MOUTH EVERY DAY (Patient not taking: Reported on 12/24/2024), Disp: 30 tablet, Rfl: 1    Current Allergies     Allergies as of 12/24/2024 - Reviewed 12/24/2024   Allergen Reaction Noted    Sulfa antibiotics Rash 02/20/2016            The following portions of the patient's history were reviewed and updated as appropriate: allergies, current medications, past family history, past medical history, past social history, past surgical history and problem list.     Past Medical History:   Diagnosis Date    Anxiety     Arthritis     Lower back    Disease of thyroid gland     HL (hearing loss)     Otitis media     Sleep apnea     Sleep difficulties     Suspected sleep apnea 12/20/2022       Past Surgical History:   Procedure Laterality Date    APPENDECTOMY  2015    CHOLECYSTECTOMY      EAR SURGERY      STOMACH SURGERY      TYMPANOPLASTY         Family History   Problem Relation Age of Onset    Liver disease Mother     Cerebral aneurysm Mother     Alcohol abuse Father     Diabetes Brother     No Known Problems Brother     No Known Problems Son     Substance Abuse Daughter     No Known Problems Daughter     No Known Problems Maternal Grandmother          Medications have been verified.        Objective   /85   Pulse 63   Temp (!) 96.8 °F (36 °C)   Resp 14   SpO2 100%        Physical Exam     Physical Exam  Vitals and nursing note reviewed.   Constitutional:       General: He is awake. He is not in acute distress.     Appearance: Normal appearance. He is well-developed and normal weight.   HENT:      Head: Normocephalic and atraumatic.      Right Ear: Hearing, tympanic membrane, ear canal and external ear normal.      Left Ear: Hearing, tympanic membrane, ear canal and external ear normal.      Nose: Congestion and rhinorrhea present. Rhinorrhea is clear.      Right Turbinates:  Not enlarged, swollen or pale.      Left Turbinates: Not enlarged, swollen or pale.      Right Sinus: No maxillary sinus tenderness or frontal sinus tenderness.      Left Sinus: No maxillary sinus tenderness or frontal sinus tenderness.      Mouth/Throat:      Lips: Pink.      Mouth: Mucous membranes are moist.      Pharynx: Oropharynx is clear. Uvula midline. No oropharyngeal exudate or posterior oropharyngeal erythema.      Tonsils: No tonsillar exudate or tonsillar abscesses. 2+ on the right. 2+ on the left.   Eyes:      General: Allergic shiner present.      Extraocular Movements: Extraocular movements intact.      Conjunctiva/sclera:      Right eye: Right conjunctiva is injected.      Left eye: Left conjunctiva is injected.   Cardiovascular:      Rate and Rhythm: Normal rate and regular rhythm.      Pulses: Normal pulses.      Heart sounds: Normal heart sounds.   Pulmonary:      Effort: Pulmonary effort is normal.      Breath sounds: Normal breath sounds.   Musculoskeletal:      Cervical back: Full passive range of motion without pain, normal range of motion and neck supple.   Skin:     General: Skin is warm and dry.   Neurological:      General: No focal deficit present.      Mental Status: He is alert and oriented to person, place, and time.   Psychiatric:         Mood and Affect: Mood normal.         Behavior: Behavior normal. Behavior is cooperative.         Thought Content: Thought content normal.         Judgment: Judgment normal.

## 2024-12-24 NOTE — PATIENT INSTRUCTIONS
Apply drops to affected eye(s) as directed the next 7 days and take prednisone as directed for next 5 days. May continue oral (zyrtec, benadryl, claritin) or nasal (flonase) decongestants as needed for additional symptom. Follow-up with PCP in 3-5 days if no improvement of symptoms. Report to ER if symptoms worsen.

## 2025-01-04 NOTE — TELEPHONE ENCOUNTER
Problem: Prexisting or High Potential for Compromised Skin Integrity  Goal: Skin integrity is maintained or improved  Description: INTERVENTIONS:  - Identify patients at risk for skin breakdown  - Assess and monitor skin integrity  - Assess and monitor nutrition and hydration status  - Monitor labs   - Assess for incontinence   - Turn and reposition patient  - Assist with mobility/ambulation  - Relieve pressure over bony prominences  - Avoid friction and shearing  - Provide appropriate hygiene as needed including keeping skin clean and dry  - Evaluate need for skin moisturizer/barrier cream  - Collaborate with interdisciplinary team   - Patient/family teaching  - Consider wound care consult   1/3/2025 2035 by Rhiannon Atkins RN  Outcome: Progressing  1/3/2025 2035 by Rhiannon Atkins RN  Outcome: Progressing     Problem: PAIN - ADULT  Goal: Verbalizes/displays adequate comfort level or baseline comfort level  Description: Interventions:  - Encourage patient to monitor pain and request assistance  - Assess pain using appropriate pain scale  - Administer analgesics based on type and severity of pain and evaluate response  - Implement non-pharmacological measures as appropriate and evaluate response  - Consider cultural and social influences on pain and pain management  - Notify physician/advanced practitioner if interventions unsuccessful or patient reports new pain  1/3/2025 2035 by Rhiannon Atkins RN  Outcome: Progressing  1/3/2025 2035 by Rhiannon Atkins RN  Outcome: Progressing     Problem: INFECTION - ADULT  Goal: Absence or prevention of progression during hospitalization  Description: INTERVENTIONS:  - Assess and monitor for signs and symptoms of infection  - Monitor lab/diagnostic results  - Monitor all insertion sites, i.e. indwelling lines, tubes, and drains  - Monitor endotracheal if appropriate and nasal secretions for changes in amount and color  - [place] appropriate cooling/warming therapies per order  -  Pt states the heart racing is resolved  Has not taken any more Percocet and he feels this was the cost  He is treating the pain otc and it is not nearly as intense as it was  Will call if anything further is needed  Administer medications as ordered  - Instruct and encourage patient and family to use good hand hygiene technique  - Identify and instruct in appropriate isolation precautions for identified infection/condition  1/3/2025 2035 by Rhiannon Atkins RN  Outcome: Progressing  1/3/2025 2035 by Rhiannon Atkins RN  Outcome: Progressing  Goal: Absence of fever/infection during neutropenic period  Description: INTERVENTIONS:  - Monitor WBC    1/3/2025 2035 by Rhiannon Atkins RN  Outcome: Progressing  1/3/2025 2035 by Rhiannon Atkins RN  Outcome: Progressing     Problem: SAFETY ADULT  Goal: Patient will remain free of falls  Description: INTERVENTIONS:  - Educate patient/family on patient safety including physical limitations  - Instruct patient to call for assistance with activity   - Consult OT/PT to assist with strengthening/mobility   - Keep Call bell within reach  - Keep bed low and locked with side rails adjusted as appropriate  - Keep care items and personal belongings within reach  - Initiate and maintain comfort rounds  - Make Fall Risk Sign visible to staff  - Offer Toileting every 2 Hours, in advance of need  - Initiate/Maintain bed/ chair alarm  - Obtain necessary fall risk management equipment: rw  - Apply yellow socks and bracelet for high fall risk patients  - Consider moving patient to room near nurses station  1/3/2025 2035 by Rhiannon Atkins RN  Outcome: Progressing  1/3/2025 2035 by Rhiannon Atkins RN  Outcome: Progressing  Goal: Maintain or return to baseline ADL function  Description: INTERVENTIONS:  -  Assess patient's ability to carry out ADLs; assess patient's baseline for ADL function and identify physical deficits which impact ability to perform ADLs (bathing, care of mouth/teeth, toileting, grooming, dressing, etc.)  - Assess/evaluate cause of self-care deficits   - Assess range of motion  - Assess patient's mobility; develop plan if impaired  - Assess patient's need for assistive devices and provide as appropriate  -  Encourage maximum independence but intervene and supervise when necessary  - Involve family in performance of ADLs  - Assess for home care needs following discharge   - Consider OT consult to assist with ADL evaluation and planning for discharge  - Provide patient education as appropriate  1/3/2025 2035 by Rhiannon Atkins RN  Outcome: Progressing  1/3/2025 2035 by Rhiannon Atkins RN  Outcome: Progressing  Goal: Maintains/Returns to pre admission functional level  Description: INTERVENTIONS:  - Perform AM-PAC 6 Click Basic Mobility/ Daily Activity assessment daily.  - Set and communicate daily mobility goal to care team and patient/family/caregiver.   - Collaborate with rehabilitation services on mobility goals if consulted  - Perform Range of Motion 2 times a day.  - Reposition patient every 2 hours.  - Dangle patient 3 times a day  - Stand patient 3 times a day  - Ambulate patient 3 times a day  - Out of bed to chair 3 times a day   - Out of bed for meals 3 times a day  - Out of bed for toileting  - Record patient progress and toleration of activity level   1/3/2025 2035 by Rhiannon Atkins RN  Outcome: Progressing  1/3/2025 2035 by Rhiannon Atkins RN  Outcome: Progressing     Problem: DISCHARGE PLANNING  Goal: Discharge to home or other facility with appropriate resources  Description: INTERVENTIONS:  - Identify barriers to discharge w/patient and caregiver  - Arrange for needed discharge resources and transportation as appropriate  - Identify discharge learning needs (meds, wound care, etc.)  - Arrange for interpretive services to assist at discharge as needed  - Refer to Case Management Department for coordinating discharge planning if the patient needs post-hospital services based on physician/advanced practitioner order or complex needs related to functional status, cognitive ability, or social support system  1/3/2025 2035 by Rhiannon Atkins RN  Outcome: Progressing  1/3/2025 2035 by Rhiannon Atkins RN  Outcome: Progressing      Problem: Knowledge Deficit  Goal: Patient/family/caregiver demonstrates understanding of disease process, treatment plan, medications, and discharge instructions  Description: Complete learning assessment and assess knowledge base.  Interventions:  - Provide teaching at level of understanding  - Provide teaching via preferred learning methods  1/3/2025 2035 by Rhiannon Atkins RN  Outcome: Progressing  1/3/2025 2035 by Rhiannon Atkins, RN  Outcome: Progressing

## 2025-01-17 DIAGNOSIS — J45.20 MILD INTERMITTENT ASTHMA WITHOUT COMPLICATION: ICD-10-CM

## 2025-01-17 RX ORDER — ALBUTEROL SULFATE 90 UG/1
INHALANT RESPIRATORY (INHALATION)
Qty: 6.7 G | Refills: 1 | Status: SHIPPED | OUTPATIENT
Start: 2025-01-17

## 2025-02-18 NOTE — TELEPHONE ENCOUNTER
Patient is aware and understands instructions  Treatment Goal Explanation (Does Not Render In The Note): Stable for the purposes of categorizing medical decision making is defined by the specific treatment goals for an individual patient. A patient that is not at their treatment goal is not stable, even if the condition has not changed and there is no short- term threat to life or function.

## 2025-04-17 ENCOUNTER — OFFICE VISIT (OUTPATIENT)
Dept: FAMILY MEDICINE CLINIC | Facility: MEDICAL CENTER | Age: 55
End: 2025-04-17
Payer: COMMERCIAL

## 2025-04-17 VITALS
TEMPERATURE: 98.6 F | OXYGEN SATURATION: 97 % | RESPIRATION RATE: 18 BRPM | SYSTOLIC BLOOD PRESSURE: 110 MMHG | WEIGHT: 214.2 LBS | HEART RATE: 79 BPM | DIASTOLIC BLOOD PRESSURE: 70 MMHG | HEIGHT: 67 IN | BODY MASS INDEX: 33.62 KG/M2

## 2025-04-17 DIAGNOSIS — M77.11 LATERAL EPICONDYLITIS OF RIGHT ELBOW: Primary | ICD-10-CM

## 2025-04-17 DIAGNOSIS — F41.0 PANIC DISORDER: ICD-10-CM

## 2025-04-17 PROCEDURE — 99214 OFFICE O/P EST MOD 30 MIN: CPT

## 2025-04-17 RX ORDER — ESCITALOPRAM OXALATE 10 MG/1
10 TABLET ORAL DAILY
Qty: 90 TABLET | Refills: 1 | Status: SHIPPED | OUTPATIENT
Start: 2025-04-17 | End: 2025-10-14

## 2025-04-17 RX ORDER — MELOXICAM 15 MG/1
15 TABLET ORAL DAILY
Qty: 30 TABLET | Refills: 0 | Status: SHIPPED | OUTPATIENT
Start: 2025-04-17

## 2025-04-17 NOTE — PROGRESS NOTES
Name: Umberto Dominguez      : 1970      MRN: [id number]  Encounter Provider: FOSTER Casarez  Encounter Date: 2025   Encounter department: [facility]  :  Assessment & Plan  Lateral epicondylitis of right elbow  Advised about rest, ice, elevation and may continue elbow brace.  Meloxicam once daily.  Avoid additional use of NSAIDs while taking meloxicam.  Tylenol okay as well.  Topical muscle rubs such as Biofreeze or Aspercreme.  Advised to schedule with physical therapy.  Return to office if worsening or no improvement in symptoms.  Orders:    Ambulatory Referral to Physical Therapy; Future    meloxicam (MOBIC) 15 mg tablet; Take 1 tablet (15 mg total) by mouth daily    Panic disorder  Refilled Lexapro per patient request, working well for him and denies adverse side effects.  Follow-up with PCP in September as scheduled, sooner if needed.  Orders:    escitalopram (LEXAPRO) 10 mg tablet; Take 1 tablet (10 mg total) by mouth daily for 180 doses           History of Present Illness   54-year-old male patient of Dr. Comer presents with complaint of right elbow pain x 2 weeks.   He has been using brace for tennis elbow, taking aleve and ice which has been helping with the pain.   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   Denies swelling, redness, fever, weakness, numbness, tingling down arm, non-radiating pain.    He has panic disorder, doing well with Lexapro and would like a refill today. He is scheduled with Dr. Comer PCP in September for his physical.         Review of Systems   Constitutional: Negative.    HENT: Negative.     Eyes: Negative.    Respiratory: Negative.     Cardiovascular: Negative.    Gastrointestinal: Negative.    Endocrine: Negative.    Genitourinary: Negative.    Musculoskeletal:         Right elbow pain   Skin: Negative.    Allergic/Immunologic:  "Negative.    Neurological: Negative.    Hematological: Negative.    Psychiatric/Behavioral: Negative.         Objective   /70 (BP Location: Left arm, Patient Position: Sitting, Cuff Size: Large)   Pulse 79   Temp 98.6 °F (37 °C) (Temporal)   Resp 18   Ht 5' 7\" (1.702 m)   Wt 97.2 kg (214 lb 3.2 oz)   SpO2 97%   BMI 33.55 kg/m²      Physical Exam  Vitals and nursing note reviewed.   Constitutional:       General: He is not in acute distress.     Appearance: Normal appearance. He is not ill-appearing.   HENT:      Head: Normocephalic and atraumatic.   Cardiovascular:      Rate and Rhythm: Normal rate.   Pulmonary:      Effort: Pulmonary effort is normal.   Musculoskeletal:         General: Normal range of motion.      Right elbow: Tenderness present in lateral epicondyle.      Left elbow: Normal.      Cervical back: Normal range of motion and neck supple.   Skin:     General: Skin is warm and dry.   Neurological:      General: No focal deficit present.      Mental Status: He is alert and oriented to person, place, and time. Mental status is at baseline.   Psychiatric:         Mood and Affect: Mood normal.         Behavior: Behavior normal.         Thought Content: Thought content normal.       "